# Patient Record
Sex: MALE | NOT HISPANIC OR LATINO | Employment: FULL TIME | URBAN - METROPOLITAN AREA
[De-identification: names, ages, dates, MRNs, and addresses within clinical notes are randomized per-mention and may not be internally consistent; named-entity substitution may affect disease eponyms.]

---

## 2022-04-08 PROCEDURE — 99285 EMERGENCY DEPT VISIT HI MDM: CPT

## 2022-04-09 ENCOUNTER — APPOINTMENT (OUTPATIENT)
Dept: ULTRASOUND IMAGING | Facility: HOSPITAL | Age: 62
End: 2022-04-09

## 2022-04-09 ENCOUNTER — APPOINTMENT (OUTPATIENT)
Dept: CT IMAGING | Facility: HOSPITAL | Age: 62
End: 2022-04-09

## 2022-04-09 ENCOUNTER — INPATIENT HOSPITAL (OUTPATIENT)
Dept: URBAN - METROPOLITAN AREA HOSPITAL 84 | Facility: HOSPITAL | Age: 62
End: 2022-04-09

## 2022-04-09 ENCOUNTER — HOSPITAL ENCOUNTER (INPATIENT)
Facility: HOSPITAL | Age: 62
LOS: 1 days | Discharge: HOME | End: 2022-04-10
Attending: EMERGENCY MEDICINE | Admitting: INTERNAL MEDICINE

## 2022-04-09 DIAGNOSIS — K85.20 ALCOHOL INDUCED ACUTE PANCREATITIS WITHOUT NECROSIS OR INFEC: ICD-10-CM

## 2022-04-09 DIAGNOSIS — R93.3 ABNORMAL FINDINGS ON DIAGNOSTIC IMAGING OF OTHER PARTS OF DI: ICD-10-CM

## 2022-04-09 DIAGNOSIS — R10.13 EPIGASTRIC PAIN: ICD-10-CM

## 2022-04-09 DIAGNOSIS — K85.90 ACUTE PANCREATITIS, UNSPECIFIED COMPLICATION STATUS, UNSPECIFIED PANCREATITIS TYPE: ICD-10-CM

## 2022-04-09 DIAGNOSIS — K57.30 DIVERTICULOSIS OF LARGE INTESTINE WITHOUT PERFORATION OR ABS: ICD-10-CM

## 2022-04-09 DIAGNOSIS — R10.13 EPIGASTRIC PAIN: Primary | ICD-10-CM

## 2022-04-09 DIAGNOSIS — Z80.0 FAMILY HISTORY OF MALIGNANT NEOPLASM OF DIGESTIVE ORGANS: ICD-10-CM

## 2022-04-09 PROBLEM — F19.90 EXCESSIVE USE OF NONSTEROIDAL ANTI-INFLAMMATORY DRUG (NSAID): Status: ACTIVE | Noted: 2022-04-09

## 2022-04-09 PROBLEM — F10.10 ALCOHOL ABUSE: Status: ACTIVE | Noted: 2022-04-09

## 2022-04-09 PROBLEM — I10 ESSENTIAL HYPERTENSION: Status: ACTIVE | Noted: 2022-04-09

## 2022-04-09 PROBLEM — K85.00 IDIOPATHIC ACUTE PANCREATITIS WITHOUT INFECTION OR NECROSIS: Status: ACTIVE | Noted: 2022-04-09

## 2022-04-09 LAB
ALBUMIN SERPL-MCNC: 4.1 G/DL (ref 3.5–5.2)
ALBUMIN/GLOB SERPL: 1.6 G/DL
ALP SERPL-CCNC: 42 U/L (ref 39–117)
ALT SERPL W P-5'-P-CCNC: 16 U/L (ref 1–41)
AMPHET+METHAMPHET UR QL: NEGATIVE
ANION GAP SERPL CALCULATED.3IONS-SCNC: 11 MMOL/L (ref 5–15)
ANION GAP SERPL CALCULATED.3IONS-SCNC: 9 MMOL/L (ref 5–15)
AST SERPL-CCNC: 24 U/L (ref 1–40)
BARBITURATES UR QL SCN: NEGATIVE
BASOPHILS # BLD AUTO: 0.1 10*3/MM3 (ref 0–0.2)
BASOPHILS # BLD AUTO: 0.1 10*3/MM3 (ref 0–0.2)
BASOPHILS NFR BLD AUTO: 0.5 % (ref 0–1.5)
BASOPHILS NFR BLD AUTO: 0.8 % (ref 0–1.5)
BENZODIAZ UR QL SCN: NEGATIVE
BILIRUB SERPL-MCNC: 0.5 MG/DL (ref 0–1.2)
BILIRUB UR QL STRIP: NEGATIVE
BUN SERPL-MCNC: 10 MG/DL (ref 8–23)
BUN SERPL-MCNC: 11 MG/DL (ref 8–23)
BUN/CREAT SERPL: 10.6 (ref 7–25)
BUN/CREAT SERPL: 11.7 (ref 7–25)
CALCIUM SPEC-SCNC: 8.9 MG/DL (ref 8.6–10.5)
CALCIUM SPEC-SCNC: 9.1 MG/DL (ref 8.6–10.5)
CANNABINOIDS SERPL QL: NEGATIVE
CHLORIDE SERPL-SCNC: 106 MMOL/L (ref 98–107)
CHLORIDE SERPL-SCNC: 107 MMOL/L (ref 98–107)
CLARITY UR: CLEAR
CO2 SERPL-SCNC: 24 MMOL/L (ref 22–29)
CO2 SERPL-SCNC: 26 MMOL/L (ref 22–29)
COCAINE UR QL: NEGATIVE
COLOR UR: YELLOW
CREAT SERPL-MCNC: 0.94 MG/DL (ref 0.76–1.27)
CREAT SERPL-MCNC: 0.94 MG/DL (ref 0.76–1.27)
DEPRECATED RDW RBC AUTO: 45.1 FL (ref 37–54)
DEPRECATED RDW RBC AUTO: 45.5 FL (ref 37–54)
EGFRCR SERPLBLD CKD-EPI 2021: 92.2 ML/MIN/1.73
EGFRCR SERPLBLD CKD-EPI 2021: 92.2 ML/MIN/1.73
EOSINOPHIL # BLD AUTO: 0.2 10*3/MM3 (ref 0–0.4)
EOSINOPHIL # BLD AUTO: 0.2 10*3/MM3 (ref 0–0.4)
EOSINOPHIL NFR BLD AUTO: 1.4 % (ref 0.3–6.2)
EOSINOPHIL NFR BLD AUTO: 1.6 % (ref 0.3–6.2)
ERYTHROCYTE [DISTWIDTH] IN BLOOD BY AUTOMATED COUNT: 13.9 % (ref 12.3–15.4)
ERYTHROCYTE [DISTWIDTH] IN BLOOD BY AUTOMATED COUNT: 13.9 % (ref 12.3–15.4)
ETHANOL UR QL: <0.01 %
GLOBULIN UR ELPH-MCNC: 2.5 GM/DL
GLUCOSE SERPL-MCNC: 105 MG/DL (ref 65–99)
GLUCOSE SERPL-MCNC: 113 MG/DL (ref 65–99)
GLUCOSE UR STRIP-MCNC: NEGATIVE MG/DL
HCT VFR BLD AUTO: 37.8 % (ref 37.5–51)
HCT VFR BLD AUTO: 40.1 % (ref 37.5–51)
HGB BLD-MCNC: 12.9 G/DL (ref 13–17.7)
HGB BLD-MCNC: 13.8 G/DL (ref 13–17.7)
HGB UR QL STRIP.AUTO: NEGATIVE
HOLD SPECIMEN: NORMAL
HOLD SPECIMEN: NORMAL
KETONES UR QL STRIP: ABNORMAL
LEUKOCYTE ESTERASE UR QL STRIP.AUTO: NEGATIVE
LIPASE SERPL-CCNC: 1611 U/L (ref 13–60)
LYMPHOCYTES # BLD AUTO: 1.2 10*3/MM3 (ref 0.7–3.1)
LYMPHOCYTES # BLD AUTO: 2 10*3/MM3 (ref 0.7–3.1)
LYMPHOCYTES NFR BLD AUTO: 11 % (ref 19.6–45.3)
LYMPHOCYTES NFR BLD AUTO: 14.6 % (ref 19.6–45.3)
MAGNESIUM SERPL-MCNC: 1.8 MG/DL (ref 1.6–2.4)
MCH RBC QN AUTO: 31.7 PG (ref 26.6–33)
MCH RBC QN AUTO: 31.7 PG (ref 26.6–33)
MCHC RBC AUTO-ENTMCNC: 34.1 G/DL (ref 31.5–35.7)
MCHC RBC AUTO-ENTMCNC: 34.4 G/DL (ref 31.5–35.7)
MCV RBC AUTO: 92.1 FL (ref 79–97)
MCV RBC AUTO: 92.9 FL (ref 79–97)
METHADONE UR QL SCN: NEGATIVE
MONOCYTES # BLD AUTO: 0.3 10*3/MM3 (ref 0.1–0.9)
MONOCYTES # BLD AUTO: 0.8 10*3/MM3 (ref 0.1–0.9)
MONOCYTES NFR BLD AUTO: 2.6 % (ref 5–12)
MONOCYTES NFR BLD AUTO: 6.1 % (ref 5–12)
NEUTROPHILS NFR BLD AUTO: 10.4 10*3/MM3 (ref 1.7–7)
NEUTROPHILS NFR BLD AUTO: 77.1 % (ref 42.7–76)
NEUTROPHILS NFR BLD AUTO: 84.3 % (ref 42.7–76)
NEUTROPHILS NFR BLD AUTO: 9.3 10*3/MM3 (ref 1.7–7)
NITRITE UR QL STRIP: NEGATIVE
NRBC BLD AUTO-RTO: 0 /100 WBC (ref 0–0.2)
NRBC BLD AUTO-RTO: 0 /100 WBC (ref 0–0.2)
OPIATES UR QL: POSITIVE
OXYCODONE UR QL SCN: NEGATIVE
PH UR STRIP.AUTO: 5.5 [PH] (ref 5–8)
PLATELET # BLD AUTO: 230 10*3/MM3 (ref 140–450)
PLATELET # BLD AUTO: 282 10*3/MM3 (ref 140–450)
PMV BLD AUTO: 8.8 FL (ref 6–12)
PMV BLD AUTO: 9.3 FL (ref 6–12)
POTASSIUM SERPL-SCNC: 3.3 MMOL/L (ref 3.5–5.2)
POTASSIUM SERPL-SCNC: 4 MMOL/L (ref 3.5–5.2)
PROT SERPL-MCNC: 6.6 G/DL (ref 6–8.5)
PROT UR QL STRIP: NEGATIVE
RBC # BLD AUTO: 4.07 10*6/MM3 (ref 4.14–5.8)
RBC # BLD AUTO: 4.36 10*6/MM3 (ref 4.14–5.8)
SARS-COV-2 RNA PNL SPEC NAA+PROBE: NOT DETECTED
SODIUM SERPL-SCNC: 141 MMOL/L (ref 136–145)
SODIUM SERPL-SCNC: 142 MMOL/L (ref 136–145)
SP GR UR STRIP: 1.05 (ref 1–1.03)
TROPONIN T SERPL-MCNC: <0.01 NG/ML (ref 0–0.03)
UROBILINOGEN UR QL STRIP: ABNORMAL
WBC NRBC COR # BLD: 11.1 10*3/MM3 (ref 3.4–10.8)
WBC NRBC COR # BLD: 13.5 10*3/MM3 (ref 3.4–10.8)
WHOLE BLOOD HOLD SPECIMEN: NORMAL
WHOLE BLOOD HOLD SPECIMEN: NORMAL

## 2022-04-09 PROCEDURE — 80307 DRUG TEST PRSMV CHEM ANLYZR: CPT | Performed by: PHYSICIAN ASSISTANT

## 2022-04-09 PROCEDURE — 0 IOPAMIDOL PER 1 ML: Performed by: EMERGENCY MEDICINE

## 2022-04-09 PROCEDURE — 83735 ASSAY OF MAGNESIUM: CPT | Performed by: PHYSICIAN ASSISTANT

## 2022-04-09 PROCEDURE — 74177 CT ABD & PELVIS W/CONTRAST: CPT

## 2022-04-09 PROCEDURE — 87635 SARS-COV-2 COVID-19 AMP PRB: CPT | Performed by: EMERGENCY MEDICINE

## 2022-04-09 PROCEDURE — 25010000002 ENOXAPARIN PER 10 MG: Performed by: INTERNAL MEDICINE

## 2022-04-09 PROCEDURE — 83690 ASSAY OF LIPASE: CPT | Performed by: PHYSICIAN ASSISTANT

## 2022-04-09 PROCEDURE — 76705 ECHO EXAM OF ABDOMEN: CPT

## 2022-04-09 PROCEDURE — 85025 COMPLETE CBC W/AUTO DIFF WBC: CPT | Performed by: INTERNAL MEDICINE

## 2022-04-09 PROCEDURE — 81003 URINALYSIS AUTO W/O SCOPE: CPT | Performed by: PHYSICIAN ASSISTANT

## 2022-04-09 PROCEDURE — 99222 1ST HOSP IP/OBS MODERATE 55: CPT | Performed by: INTERNAL MEDICINE

## 2022-04-09 PROCEDURE — 85025 COMPLETE CBC W/AUTO DIFF WBC: CPT | Performed by: PHYSICIAN ASSISTANT

## 2022-04-09 PROCEDURE — 84484 ASSAY OF TROPONIN QUANT: CPT | Performed by: PHYSICIAN ASSISTANT

## 2022-04-09 PROCEDURE — 99223 1ST HOSP IP/OBS HIGH 75: CPT | Performed by: NURSE PRACTITIONER

## 2022-04-09 PROCEDURE — 36415 COLL VENOUS BLD VENIPUNCTURE: CPT | Performed by: INTERNAL MEDICINE

## 2022-04-09 PROCEDURE — 80053 COMPREHEN METABOLIC PANEL: CPT | Performed by: PHYSICIAN ASSISTANT

## 2022-04-09 PROCEDURE — 25010000002 ONDANSETRON PER 1 MG: Performed by: PHYSICIAN ASSISTANT

## 2022-04-09 PROCEDURE — 82077 ASSAY SPEC XCP UR&BREATH IA: CPT | Performed by: PHYSICIAN ASSISTANT

## 2022-04-09 PROCEDURE — 25010000002 MORPHINE PER 10 MG: Performed by: INTERNAL MEDICINE

## 2022-04-09 PROCEDURE — 0 MORPHINE SULFATE 4 MG/ML SOLUTION: Performed by: PHYSICIAN ASSISTANT

## 2022-04-09 RX ORDER — SODIUM CHLORIDE 0.9 % (FLUSH) 0.9 %
10 SYRINGE (ML) INJECTION AS NEEDED
Status: DISCONTINUED | OUTPATIENT
Start: 2022-04-09 | End: 2022-04-10 | Stop reason: HOSPADM

## 2022-04-09 RX ORDER — NITROGLYCERIN 0.4 MG/1
0.4 TABLET SUBLINGUAL
Status: DISCONTINUED | OUTPATIENT
Start: 2022-04-09 | End: 2022-04-10 | Stop reason: HOSPADM

## 2022-04-09 RX ORDER — ONDANSETRON 4 MG/1
4 TABLET, FILM COATED ORAL EVERY 6 HOURS PRN
Status: DISCONTINUED | OUTPATIENT
Start: 2022-04-09 | End: 2022-04-10 | Stop reason: HOSPADM

## 2022-04-09 RX ORDER — MORPHINE SULFATE 4 MG/ML
4 INJECTION, SOLUTION INTRAMUSCULAR; INTRAVENOUS ONCE
Status: COMPLETED | OUTPATIENT
Start: 2022-04-09 | End: 2022-04-09

## 2022-04-09 RX ORDER — ALUMINA, MAGNESIA, AND SIMETHICONE 2400; 2400; 240 MG/30ML; MG/30ML; MG/30ML
15 SUSPENSION ORAL EVERY 6 HOURS PRN
Status: DISCONTINUED | OUTPATIENT
Start: 2022-04-09 | End: 2022-04-10 | Stop reason: HOSPADM

## 2022-04-09 RX ORDER — PANTOPRAZOLE SODIUM 40 MG/10ML
40 INJECTION, POWDER, LYOPHILIZED, FOR SOLUTION INTRAVENOUS EVERY 12 HOURS SCHEDULED
Status: DISCONTINUED | OUTPATIENT
Start: 2022-04-09 | End: 2022-04-10 | Stop reason: HOSPADM

## 2022-04-09 RX ORDER — MORPHINE SULFATE 2 MG/ML
2 INJECTION, SOLUTION INTRAMUSCULAR; INTRAVENOUS
Status: DISCONTINUED | OUTPATIENT
Start: 2022-04-09 | End: 2022-04-10 | Stop reason: HOSPADM

## 2022-04-09 RX ORDER — MELOXICAM 7.5 MG/1
7.5 TABLET ORAL DAILY
Status: ON HOLD | COMMUNITY
End: 2022-04-09

## 2022-04-09 RX ORDER — ACETAMINOPHEN 160 MG/5ML
650 SOLUTION ORAL EVERY 4 HOURS PRN
Status: DISCONTINUED | OUTPATIENT
Start: 2022-04-09 | End: 2022-04-10 | Stop reason: HOSPADM

## 2022-04-09 RX ORDER — ACETAMINOPHEN 325 MG/1
650 TABLET ORAL EVERY 4 HOURS PRN
Status: DISCONTINUED | OUTPATIENT
Start: 2022-04-09 | End: 2022-04-10 | Stop reason: HOSPADM

## 2022-04-09 RX ORDER — SODIUM CHLORIDE 0.9 % (FLUSH) 0.9 %
10 SYRINGE (ML) INJECTION EVERY 12 HOURS SCHEDULED
Status: DISCONTINUED | OUTPATIENT
Start: 2022-04-09 | End: 2022-04-10 | Stop reason: HOSPADM

## 2022-04-09 RX ORDER — ONDANSETRON 2 MG/ML
4 INJECTION INTRAMUSCULAR; INTRAVENOUS EVERY 6 HOURS PRN
Status: DISCONTINUED | OUTPATIENT
Start: 2022-04-09 | End: 2022-04-10 | Stop reason: HOSPADM

## 2022-04-09 RX ORDER — LISINOPRIL AND HYDROCHLOROTHIAZIDE 12.5; 1 MG/1; MG/1
1 TABLET ORAL DAILY
COMMUNITY

## 2022-04-09 RX ORDER — CETIRIZINE HYDROCHLORIDE 10 MG/1
10 TABLET ORAL DAILY
Status: ON HOLD | COMMUNITY
End: 2022-04-09

## 2022-04-09 RX ORDER — ACETAMINOPHEN 650 MG/1
650 SUPPOSITORY RECTAL EVERY 4 HOURS PRN
Status: DISCONTINUED | OUTPATIENT
Start: 2022-04-09 | End: 2022-04-10 | Stop reason: HOSPADM

## 2022-04-09 RX ORDER — MONTELUKAST SODIUM 10 MG/1
10 TABLET ORAL NIGHTLY
Status: DISCONTINUED | OUTPATIENT
Start: 2022-04-09 | End: 2022-04-10 | Stop reason: HOSPADM

## 2022-04-09 RX ORDER — MONTELUKAST SODIUM 10 MG/1
10 TABLET ORAL NIGHTLY
COMMUNITY

## 2022-04-09 RX ORDER — DICLOFENAC SODIUM 75 MG/1
75 TABLET, DELAYED RELEASE ORAL 2 TIMES DAILY
COMMUNITY
End: 2022-04-10 | Stop reason: HOSPADM

## 2022-04-09 RX ORDER — CHOLECALCIFEROL (VITAMIN D3) 125 MCG
5 CAPSULE ORAL NIGHTLY PRN
Status: DISCONTINUED | OUTPATIENT
Start: 2022-04-09 | End: 2022-04-10 | Stop reason: HOSPADM

## 2022-04-09 RX ORDER — SODIUM CHLORIDE, SODIUM LACTATE, POTASSIUM CHLORIDE, CALCIUM CHLORIDE 600; 310; 30; 20 MG/100ML; MG/100ML; MG/100ML; MG/100ML
125 INJECTION, SOLUTION INTRAVENOUS CONTINUOUS
Status: DISCONTINUED | OUTPATIENT
Start: 2022-04-09 | End: 2022-04-10 | Stop reason: HOSPADM

## 2022-04-09 RX ORDER — TADALAFIL 20 MG/1
20 TABLET ORAL WEEKLY
COMMUNITY

## 2022-04-09 RX ORDER — POTASSIUM CHLORIDE 20 MEQ/1
20 TABLET, EXTENDED RELEASE ORAL DAILY
Status: DISCONTINUED | OUTPATIENT
Start: 2022-04-09 | End: 2022-04-10 | Stop reason: HOSPADM

## 2022-04-09 RX ORDER — ONDANSETRON 2 MG/ML
4 INJECTION INTRAMUSCULAR; INTRAVENOUS ONCE
Status: COMPLETED | OUTPATIENT
Start: 2022-04-09 | End: 2022-04-09

## 2022-04-09 RX ORDER — HYDRALAZINE HYDROCHLORIDE 20 MG/ML
10 INJECTION INTRAMUSCULAR; INTRAVENOUS EVERY 6 HOURS PRN
Status: DISCONTINUED | OUTPATIENT
Start: 2022-04-09 | End: 2022-04-10 | Stop reason: HOSPADM

## 2022-04-09 RX ADMIN — IOPAMIDOL 100 ML: 755 INJECTION, SOLUTION INTRAVENOUS at 02:27

## 2022-04-09 RX ADMIN — POTASSIUM CHLORIDE 20 MEQ: 20 TABLET, EXTENDED RELEASE ORAL at 03:39

## 2022-04-09 RX ADMIN — PANTOPRAZOLE SODIUM 40 MG: 40 INJECTION, POWDER, FOR SOLUTION INTRAVENOUS at 20:13

## 2022-04-09 RX ADMIN — PANTOPRAZOLE SODIUM 40 MG: 40 INJECTION, POWDER, FOR SOLUTION INTRAVENOUS at 05:08

## 2022-04-09 RX ADMIN — Medication 10 ML: at 09:05

## 2022-04-09 RX ADMIN — SODIUM CHLORIDE, POTASSIUM CHLORIDE, SODIUM LACTATE AND CALCIUM CHLORIDE 1000 ML: 600; 310; 30; 20 INJECTION, SOLUTION INTRAVENOUS at 03:39

## 2022-04-09 RX ADMIN — Medication 10 ML: at 20:13

## 2022-04-09 RX ADMIN — ENOXAPARIN SODIUM 40 MG: 40 INJECTION SUBCUTANEOUS at 16:25

## 2022-04-09 RX ADMIN — MORPHINE SULFATE 2 MG: 2 INJECTION, SOLUTION INTRAMUSCULAR; INTRAVENOUS at 09:05

## 2022-04-09 RX ADMIN — ONDANSETRON 4 MG: 2 INJECTION INTRAMUSCULAR; INTRAVENOUS at 00:55

## 2022-04-09 RX ADMIN — SODIUM CHLORIDE, POTASSIUM CHLORIDE, SODIUM LACTATE AND CALCIUM CHLORIDE 125 ML/HR: 600; 310; 30; 20 INJECTION, SOLUTION INTRAVENOUS at 20:14

## 2022-04-09 RX ADMIN — MONTELUKAST 10 MG: 10 TABLET, FILM COATED ORAL at 20:13

## 2022-04-09 RX ADMIN — SODIUM CHLORIDE, POTASSIUM CHLORIDE, SODIUM LACTATE AND CALCIUM CHLORIDE 125 ML/HR: 600; 310; 30; 20 INJECTION, SOLUTION INTRAVENOUS at 05:08

## 2022-04-09 RX ADMIN — MORPHINE SULFATE 4 MG: 4 INJECTION INTRAVENOUS at 00:55

## 2022-04-09 RX ADMIN — MORPHINE SULFATE 4 MG: 4 INJECTION INTRAVENOUS at 03:04

## 2022-04-09 RX ADMIN — SODIUM CHLORIDE, POTASSIUM CHLORIDE, SODIUM LACTATE AND CALCIUM CHLORIDE 125 ML/HR: 600; 310; 30; 20 INJECTION, SOLUTION INTRAVENOUS at 13:27

## 2022-04-09 NOTE — ED PROVIDER NOTES
Subjective   Patient is a 61-year-old male who presents to the ED with complaints of epigastric abdominal pain that is been intermittent over the past year or so but became very severe tonight.  Patient states the pain started after eating.  He describes it as a sharp type pain that waxes and wanes in intensity.  He currently rates it as moderate in severity.  Patient states the pain is nonradiating from the area.  Patient reports associated nausea and 2 episodes of emesis earlier this evening without any hematemesis.  Patient reports intermittent diarrhea and constipation which is normal for him.  He denies any black or bloody stools.  No urinary symptoms.  No recent travel or known sick contacts.  No fever body aches or chills.  No significant chest pain or shortness of breath.  He states he tried taking some over-the-counter Tums with minimal relief of his symptoms.  Patient denies any pertinent abdominal surgical history.  States he does take diclofenac and meloxicam for chronic hip pain.  Patient denies any other alleviating or exacerbating factors.      History provided by:  Patient      Review of Systems   Constitutional: Negative.    Eyes: Negative for photophobia and visual disturbance.   Respiratory: Negative.    Cardiovascular: Negative.    Gastrointestinal: Positive for abdominal pain, constipation, diarrhea, nausea and vomiting. Negative for abdominal distention and blood in stool.   Genitourinary: Negative.    Musculoskeletal: Negative.    Skin: Negative.    Neurological: Negative.    Hematological: Negative.        Past Medical History:   Diagnosis Date   • Erectile dysfunction    • Hypertension    • Joint pain        No Known Allergies    Past Surgical History:   Procedure Laterality Date   • CERVICAL FUSION POSTERIOR WITH WIRING     • MEDIAL COLLATERAL LIGAMENT REPAIR, KNEE Bilateral        History reviewed. No pertinent family history.    Social History     Socioeconomic History   • Marital status:     Tobacco Use   • Smoking status: Former Smoker     Packs/day: 1.50     Years: 25.00     Pack years: 37.50     Quit date: 2007     Years since quittin.9   • Smokeless tobacco: Former User     Types: Chew   Substance and Sexual Activity   • Alcohol use: Yes     Alcohol/week: 4.0 standard drinks     Types: 4 Cans of beer per week     Comment: Reports drinking 1 beer every other day more socially   • Drug use: Never           Objective   Physical Exam  Vitals and nursing note reviewed.   Constitutional:       General: He is not in acute distress.     Appearance: Normal appearance. He is well-developed. He is not ill-appearing, toxic-appearing or diaphoretic.   HENT:      Head: Normocephalic and atraumatic.      Mouth/Throat:      Mouth: Mucous membranes are moist.      Pharynx: Oropharynx is clear.   Eyes:      General: No scleral icterus.     Extraocular Movements: Extraocular movements intact.      Pupils: Pupils are equal, round, and reactive to light.   Cardiovascular:      Rate and Rhythm: Normal rate and regular rhythm.      Heart sounds: No murmur heard.    No friction rub. No gallop.   Pulmonary:      Effort: Pulmonary effort is normal. No tachypnea, accessory muscle usage or respiratory distress.      Breath sounds: Normal breath sounds. No stridor. No decreased breath sounds, wheezing, rhonchi or rales.   Chest:      Chest wall: No mass, deformity, tenderness or crepitus.   Abdominal:      General: There is no distension.      Palpations: Abdomen is soft. There is hepatomegaly. There is no splenomegaly or mass.      Tenderness: There is abdominal tenderness in the epigastric area. There is no right CVA tenderness, left CVA tenderness, guarding or rebound. Negative signs include Mercado's sign and McBurney's sign.      Hernia: No hernia is present.   Musculoskeletal:      Cervical back: Normal range of motion and neck supple.   Skin:     General: Skin is warm.      Capillary Refill: Capillary  "refill takes less than 2 seconds.      Findings: No rash.   Neurological:      Mental Status: He is alert and oriented to person, place, and time.   Psychiatric:         Mood and Affect: Mood normal.         Behavior: Behavior normal.         Procedures           ED Course      Blood pressure 116/69, pulse 64, temperature 97.7 °F (36.5 °C), temperature source Oral, resp. rate 15, height 182.9 cm (72\"), weight 81.6 kg (180 lb), SpO2 96 %.    Medications   sodium chloride 0.9 % flush 10 mL (has no administration in time range)   potassium chloride (K-DUR,KLOR-CON) CR tablet 20 mEq (has no administration in time range)   Morphine sulfate (PF) injection 4 mg (4 mg Intravenous Given 4/9/22 0055)   ondansetron (ZOFRAN) injection 4 mg (4 mg Intravenous Given 4/9/22 0055)   iopamidol (ISOVUE-370) 76 % injection 100 mL (100 mL Intravenous Given 4/9/22 0227)   Morphine sulfate (PF) injection 4 mg (4 mg Intravenous Given 4/9/22 0304)     Labs Reviewed   COMPREHENSIVE METABOLIC PANEL - Abnormal; Notable for the following components:       Result Value    Glucose 113 (*)     Potassium 3.3 (*)     All other components within normal limits    Narrative:     GFR Normal >60  Chronic Kidney Disease <60  Kidney Failure <15     LIPASE - Abnormal; Notable for the following components:    Lipase 1,611 (*)     All other components within normal limits   URINALYSIS W/ MICROSCOPIC IF INDICATED (NO CULTURE) - Abnormal; Notable for the following components:    Specific Gravity, UA 1.046 (*)     Ketones, UA Trace (*)     All other components within normal limits    Narrative:     Urine microscopic not indicated.   CBC WITH AUTO DIFFERENTIAL - Abnormal; Notable for the following components:    WBC 11.10 (*)     Neutrophil % 84.3 (*)     Lymphocyte % 11.0 (*)     Monocyte % 2.6 (*)     Neutrophils, Absolute 9.30 (*)     All other components within normal limits   TROPONIN (IN-HOUSE) - Normal    Narrative:     Troponin T Reference Range:  <= 0.03 " ng/mL-   Negative for AMI  >0.03 ng/mL-     Abnormal for myocardial necrosis.  Clinicians would have to utilize clinical acumen, EKG, Troponin and serial changes to determine if it is an Acute Myocardial Infarction or myocardial injury due to an underlying chronic condition.       Results may be falsely decreased if patient taking Biotin.     COVID-19,CEPHEID/VARUN,COR/ANNA/PAD/CHARLES IN-HOUSE,NP SWAB IN TRANSPORT MEDIA 3-4 HR TAT, RT-PCR   RAINBOW DRAW    Narrative:     The following orders were created for panel order [place] Draw.  Procedure                               Abnormality         Status                     ---------                               -----------         ------                     Green Top (Gel)[[id number]]                                  Final result               Lavender Top[[id number]]                                     Final result               Gold Top - SST[[id number]]                                   Final result               Light Blue Top[[id number]]                                   Final result                 Please view results for these tests on the individual orders.   MAGNESIUM   GREEN TOP   LAVENDER TOP   GOLD TOP - SST   LIGHT BLUE TOP   CBC AND DIFFERENTIAL    Narrative:     The following orders were created for panel order CBC & Differential.  Procedure                               Abnormality         Status                     ---------                               -----------         ------                     CBC Auto Differential[[id number]]        Abnormal            Final result                 Please view results for these tests on the individual orders.     CT Abdomen Pelvis With Contrast    Result Date: 4/9/2022  1.  There is very mild peripancreatic edema suggestive of mild acute pancreatitis. 2.  There is a 2.8 cm posterior duodenal diverticulum. Electronically signed by:  Shayan Farmer M.D.  4/9/2022 1:03 AM                                                  MDM  Number of Diagnoses or Management Options  Acute pancreatitis, unspecified complication status, unspecified pancreatitis type  Epigastric pain  Diagnosis management comments: Chart Review:  -No pertinent ED urgent care visits to review at the past 6 months  Comorbidity: As per past medical history  Differentials: DKA, intra-abdominal infection, dissection, peritonitis, peptic ulcer disease, pancreatitis, hepatitis, ischemic bowel, bowel obstruction, appendicitis     ;this list is not all inclusive and does not constitute the entirety of considered causes  ECG: Not warranted  Labs: As above  Imaging: Was interpreted by physician and reviewed by myself:    Disposition/Treatment:  Appropriate PPE was worn during exam and throughout all encounters with the patient.  When the ED IV was placed and labs were obtained patient placed on proper monitors he was afebrile and appeared nontoxic presents to the ED with complaints of epigastric abdominal pain.  Patient is given morphine Zofran and fluids for his pain and nausea.    Lab results showed white blood cell count 11.1 patient is hemodynamically stable.  Lipase elevated at 1611.  Metabolic panel showed glucose 113 but no signs of DKA with an anion gap of 11 bicarb of 24.  Patient's kidney and liver function unremarkable total bilirubin normal.  Potassium 3.3.  Potassium was replaced while in the ED. urinalysis unremarkable for UTI.  Magnesium unremarkable.     CT of abdomen pelvis was significant for acute pancreatitis.  This is new diagnosis for the patient.  Patient states he does drink a beer about every other day could be contributing factor to his acute pancreatitis.  Patient did require additional morphine while in the ED for pain control.  Lab results and findings were discussed with the patient and family at bedside patient will be admitted to hospitalist group for IV hydration and pain control for his pancreatitis.  Patient was in agreement with plan.   Patient will be admitted to hospitalist group.  Spoke to Dr. Torres who agreed for admission.  Urine drug screen and  EtOH added on and pending upon admission. Patient was also given additional LR while in the ED.        Amount and/or Complexity of Data Reviewed  Clinical lab tests: reviewed        Final diagnoses:   Epigastric pain   Acute pancreatitis, unspecified complication status, unspecified pancreatitis type       ED Disposition  ED Disposition     ED Disposition   Decision to Admit    Condition   --    Comment   Level of Care: Telemetry [5]   Admitting Physician: RADHA TORRES [[id number]]   Attending Physician: RADHA TORRES [[id number]]               No follow-up provider specified.       Medication List      No changes were made to your prescriptions during this visit.          Katherine Jalloh PA  04/09/22 0316       Katherine Jalloh PA  04/09/22 0321       Katherine Jalloh PA  04/09/22 0326

## 2022-04-09 NOTE — PLAN OF CARE
Goal Outcome Evaluation:  Plan of Care Reviewed With: patient        Progress: no change  Outcome Evaluation: Patient shows no s/s of distress and vitals are stable. Pt voiced concerns of pain, see MAR. Call light within reach. Will continue to observe.

## 2022-04-09 NOTE — ED NOTES
Reports intermittent epigastric pain x1 year, tonight had just eaten dinner and was watching TV when sharp pain started in epigastrium and was increasing in severity. Pain did not radiate, when he called EMS he states that he was able to relieve the pain by applying pressure to area, reports some nausea, no vomiting.

## 2022-04-09 NOTE — PROGRESS NOTES
[facility] Medicine Services        Patient Name: Jerson Cox  : 1960  MRN: [id number]  Primary Care Physician:  Lin Ching APRN  Date of admission: 2022        Subjective       Chief Complaint: Abdominal pain     History of Present Illness: Jerson Cox is a 61 y.o. male who presented to [facility] on 2022 with a past medical history of arthritis possible rheumatoid arthritis LUPIS is negative but rheumatoid factor was mildly elevated, hypertension and NSAID abuse.  The patient has been having abdominal pain for about the last year.  But it comes and goes but tonight after he ate the pain became so severe.  It was piercing and upper epigastric and was not getting better so he came to the emergency room.  In the emergency room his lipase was elevated to 1600.  CT scan was performed and indicated acute pancreatitis.  So the patient will be admitted for pancreatitis.  The cause could be from alcohol he drinks most days of the week and there is some question about how much.  CT did not reveal any gallstones but will get a right upper quadrant ultrasound to determine if gallstones are the cause.  The patient also takes multiple NSAIDs.  He has a prescription for Mobic 7.5 and diclofenac 75 mg and then he takes ibuprofen periodically these can be associated with pancreatitis as well.  And for that matter lisinopril and hydrochlorothiazide can also cause pancreatitis.        ROS acute piercing abdominal pain, was worse after eating, only resolved after coming to the hospital receiving narcotics, patient denies fever, chills, chest pain, shortness of breath, rash, patient has chronic joint pains but no different than normal, cough, congestion, leg swelling changes in bowel or bladder nausea or vomiting headache and the rest of 15 essential review of systems have been reviewed and are negative   Hospital course  2022; patient is still endorsing  some epigastric pain and tenderness continue to monitor continue n.p.o. consult gastroenterology likely alcoholic pancreatitis  Personal History      Medical History        Past Medical History:   Diagnosis Date   • Erectile dysfunction     • Hypertension     • Joint pain              Surgical History         Past Surgical History:   Procedure Laterality Date   • CERVICAL FUSION POSTERIOR WITH WIRING       • MEDIAL COLLATERAL LIGAMENT REPAIR, KNEE Bilateral              Family History: family history is not on file. Otherwise pertinent FHx was reviewed and not pertinent to current issue.     Social History:  reports that he quit smoking about 14 years ago. He has a 37.50 pack-year smoking history. He has quit using smokeless tobacco.  His smokeless tobacco use included chew. He reports current alcohol use of about 4.0 standard drinks of alcohol per week. He reports that he does not use drugs.     Home Medications:           Prior to Admission Medications      Prescriptions Last Dose Informant Patient Reported? Taking?     diclofenac (VOLTAREN) 75 MG EC tablet 4/8/2022   Yes Yes     Take 75 mg by mouth 2 (Two) Times a Day.     lisinopril-hydrochlorothiazide (PRINZIDE,ZESTORETIC) 10-12.5 MG per tablet 4/8/2022 Medication Bottle Yes Yes     Take 1 tablet by mouth Daily.     meloxicam (MOBIC) 7.5 MG tablet Past Month Medication Bottle Yes Yes     Take 7.5 mg by mouth Daily.     tadalafil (ADCIRCA) 20 MG tablet tablet Past Month   Yes Yes     Take 40 mg by mouth Daily.                Allergies:  No Known Allergies     Objective       Vitals:   Temp:  [97.5 °F (36.4 °C)-98.5 °F (36.9 °C)] 98.5 °F (36.9 °C)  Heart Rate:  [55-70] 65  Resp:  [15-20] 16  BP: (102-146)/(60-90) 114/73     Physical Exam   General no apparent distress well-developed well-groomed lying in the bed comfortable  Head atraumatic  Oropharynx membranes moist no erythema  Nares no nasal discharge  Neck no thyromegaly or lymphadenopathy  Pulmonary lungs  clear to auscultation bilaterally no accessory muscle use  Cardiac regular rate and rhythm cannot appreciate any murmurs no peripheral edema  Abdomen mild upper epigastric tenderness no Mercado sign no guarding no rebound no distention bowel sounds present.  Extremities well-developed no cyanosis no edema  Neuro cranial nerves II through XII intact, no tremor  Psych patient is alert and oriented x4 answers all questions appropriately appropriate mood and effect     Result Review    Result Review:  I have personally reviewed the results from the time of this admission to 4/9/2022 04:46 EDT and agree with these findings:  [x]?  Laboratory  []?  Microbiology  [x]?  Radiology  []?  EKG/Telemetry   []?  Cardiology/Vascular   []?  Pathology  []?  Old records  []?  Other:  Most notable findings include:   Sodium 142, potassium 3.3, bicarb 24, creatinine 0.9, BUN 11, glucose 113, CMP was fairly unremarkable  Lipase was 1600  Hemoglobin 14, WBCs 11,000, platelets 282  Liver ultrasound pending        IMPRESSION: CTA of abdomen  1.  There is very mild peripancreatic edema suggestive of mild acute pancreatitis.  2.  There is a 2.8 cm posterior duodenal diverticulum.   CT Abdomen Pelvis With Contrast    Result Date: 4/9/2022  1.  There is very mild peripancreatic edema suggestive of mild acute pancreatitis. 2.  There is a 2.8 cm posterior duodenal diverticulum. Electronically signed by:  Shayan Farmer M.D.  4/9/2022 1:03 AM    US Liver    Result Date: 4/9/2022  There is some gallbladder wall thickening up to 5 mm. This is likely reflective of the patient's overall fluid status. The pancreas is not seen well enough by ultrasound to evaluate for the presence of pancreatitis. Electronically signed by:  Marcel Morton M.D.  4/9/2022 4:54 AM       Assessment/Plan          Active Hospital Problems:       Active Hospital Problems     Diagnosis     • Epigastric pain        Plan:   1.  Acute pancreatitis likely alcohol induced  -ultrasound gallbladder wall thickening 5 mm but likely from fluid status not inflammation .CT scan did not show any stones or actually dilated gallbladder.   consult GI for possible ERCP.  If this work-up proves to be negative other likely causes are alcohol he does drink most days of the week according to him.  2.  Alcohol abuse -counseled on cessation  3.  Essential hypertension have him on hydralazine now as needed hold lisinopril hydrochlorothiazide  4.  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  The patient does have a appointment with a rheumatologist and I think that will be an excellent idea.  LUPIS is negative.  Regardless needs to probably have physical therapy on an outpatient basis to help him with his pain and avoid excess NSAIDs may be try topical NSAIDs  DVT prophylaxis:  Medical DVT prophylaxis orders are present.     CODE STATUS:    Level Of Support Discussed With: Patient  Code Status (Patient has no pulse and is not breathing): CPR (Attempt to Resuscitate)  Medical Interventions (Patient has pulse or is breathing): Full Support     Admission Status:  I believe this patient meets inpatient status.     I discussed the patient's findings and my recommendations with patient and family.     This patient has been examined wearing appropriate Personal Protective Equipment and discussed with . 04/09/22    Electronically signed by Ariel Link MD, 04/09/22, 2:36 PM EDT.

## 2022-04-09 NOTE — H&P
[facility] Medicine Services      Patient Name: Jerson Cox  : 1960  MRN: [id number]  Primary Care Physician:  Lin Ching APRN  Date of admission: 2022      Subjective      Chief Complaint: Abdominal pain    History of Present Illness: Jerson Cox is a 61 y.o. male who presented to [facility] on 2022 with a past medical history of arthritis possible rheumatoid arthritis LUPIS is negative but rheumatoid factor was mildly elevated, hypertension and NSAID abuse.  The patient has been having abdominal pain for about the last year.  But it comes and goes but tonight after he ate the pain became so severe.  It was piercing and upper epigastric and was not getting better so he came to the emergency room.  In the emergency room his lipase was elevated to 1600.  CT scan was performed and indicated acute pancreatitis.  So the patient will be admitted for pancreatitis.  The cause could be from alcohol he drinks most days of the week and there is some question about how much.  CT did not reveal any gallstones but will get a right upper quadrant ultrasound to determine if gallstones are the cause.  The patient also takes multiple NSAIDs.  He has a prescription for Mobic 7.5 and diclofenac 75 mg and then he takes ibuprofen periodically these can be associated with pancreatitis as well.  And for that matter lisinopril and hydrochlorothiazide can also cause pancreatitis.      ROS acute piercing abdominal pain, was worse after eating, only resolved after coming to the hospital receiving narcotics, patient denies fever, chills, chest pain, shortness of breath, rash, patient has chronic joint pains but no different than normal, cough, congestion, leg swelling changes in bowel or bladder nausea or vomiting headache and the rest of 15 essential review of systems have been reviewed and are negative    Personal History     Past Medical History:   Diagnosis Date   •  Erectile dysfunction    • Hypertension    • Joint pain        Past Surgical History:   Procedure Laterality Date   • CERVICAL FUSION POSTERIOR WITH WIRING     • MEDIAL COLLATERAL LIGAMENT REPAIR, KNEE Bilateral        Family History: family history is not on file. Otherwise pertinent FHx was reviewed and not pertinent to current issue.    Social History:  reports that he quit smoking about 14 years ago. He has a 37.50 pack-year smoking history. He has quit using smokeless tobacco.  His smokeless tobacco use included chew. He reports current alcohol use of about 4.0 standard drinks of alcohol per week. He reports that he does not use drugs.    Home Medications:  Prior to Admission Medications     Prescriptions Last Dose Informant Patient Reported? Taking?    diclofenac (VOLTAREN) 75 MG EC tablet 4/8/2022  Yes Yes    Take 75 mg by mouth 2 (Two) Times a Day.    lisinopril-hydrochlorothiazide (PRINZIDE,ZESTORETIC) 10-12.5 MG per tablet 4/8/2022 Medication Bottle Yes Yes    Take 1 tablet by mouth Daily.    meloxicam (MOBIC) 7.5 MG tablet Past Month Medication Bottle Yes Yes    Take 7.5 mg by mouth Daily.    tadalafil (ADCIRCA) 20 MG tablet tablet Past Month  Yes Yes    Take 40 mg by mouth Daily.            Allergies:  No Known Allergies    Objective      Vitals:   Temp:  [97.7 °F (36.5 °C)] 97.7 °F (36.5 °C)  Heart Rate:  [55-70] 58  Resp:  [15-20] 15  BP: (102-146)/(60-90) 138/83    Physical Exam   General no apparent distress well-developed well-groomed  Head atraumatic  Oropharynx membranes moist no erythema  Nares no nasal discharge  Neck no thyromegaly or lymphadenopathy  Pulmonary lungs clear to auscultation bilaterally no accessory muscle use  Cardiac regular rate and rhythm cannot appreciate any murmurs no peripheral edema  Abdomen mild upper epigastric tenderness no Mercado sign no guarding no rebound no distention bowel sounds present  Extremities well-developed no cyanosis no edema  Neuro cranial nerves II  through XII intact, no tremor  Psych patient is alert and oriented x4 answers all questions appropriately appropriate mood and effect    Result Review    Result Review:  I have personally reviewed the results from the time of this admission to 4/9/2022 04:46 EDT and agree with these findings:  [x]  Laboratory  []  Microbiology  [x]  Radiology  []  EKG/Telemetry   []  Cardiology/Vascular   []  Pathology  []  Old records  []  Other:  Most notable findings include:   Sodium 142, potassium 3.3, bicarb 24, creatinine 0.9, BUN 11, glucose 113, CMP was fairly unremarkable  Lipase was 1600  Hemoglobin 14, WBCs 11,000, platelets 282  Liver ultrasound pending      IMPRESSION: CTA of abdomen  1.  There is very mild peripancreatic edema suggestive of mild acute pancreatitis.  2.  There is a 2.8 cm posterior duodenal diverticulum.      Assessment/Plan        Active Hospital Problems:  Active Hospital Problems    Diagnosis    • Epigastric pain      Plan:   1.  Acute pancreatitis -ultrasound has been ordered to check for gallstones .CT scan did not show any stones or actually dilated gallbladder.  If positive will contact general surgery for possible cholecystectomy.  If there is any indication for possible bile duct stone will consult GI for possible ERCP.  If this work-up proves to be negative other likely causes are alcohol he does drink most days of the week according to him but not excessive but is a distinct possibility.  Another distinct possibility is NSAIDs induced pancreatitis he is taking prescribed Mobic and diclofenac daily.  He is also taking ibuprofen may be 3 sometimes 4 times a week on top of these.  This could be another potential cause.  Course almost all drugs are associated with the possibility of pancreatitis and that included his blood pressure medications HCTZ and lisinopril.  The highest probability if is not gallstones most likely is the alcohol and NSAIDs.  Had long discussion about both of these tonight  and how if he is taking one of the NSAIDs he can take the others and the importance of cessation from alcohol.  Regardless when patient's pain is under control and able to take sufficient input by mouth he can be discharged  2.  Alcohol abuse -counseled on cessation  3.  Essential hypertension have him on hydralazine now as needed once he stabilizes can reinitiate his home blood pressure medication.  If however after stopping drinking NSAIDs and no gallstones may have to change his blood pressure medications  4.  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  The patient does have a appointment with a rheumatologist and I think that will be an excellent idea.  LUPIS is negative.  Regardless needs to probably have physical therapy on an outpatient basis to help him with his pain and avoid excess NSAIDs may be try topical NSAIDs  5.  Abdominal pain although most likely it is pancreatitis he may have some gastritis from all of the NSAIDs.  Recommend setting the patient up with GI on an outpatient basis for an EGD and if he has not had his colonoscopy get that as well.  Unless patient condition worsens this can be an outpatient work-up  DVT prophylaxis:  Medical DVT prophylaxis orders are present.    CODE STATUS:    Level Of Support Discussed With: Patient  Code Status (Patient has no pulse and is not breathing): CPR (Attempt to Resuscitate)  Medical Interventions (Patient has pulse or is breathing): Full Support    Admission Status:  I believe this patient meets inpatient status.    I discussed the patient's findings and my recommendations with patient and family.    This patient has been examined wearing appropriate Personal Protective Equipment and discussed with . 04/09/22      Signature:

## 2022-04-10 VITALS
OXYGEN SATURATION: 93 % | HEIGHT: 70 IN | RESPIRATION RATE: 17 BRPM | SYSTOLIC BLOOD PRESSURE: 122 MMHG | BODY MASS INDEX: 28.97 KG/M2 | TEMPERATURE: 97.7 F | WEIGHT: 202.38 LBS | DIASTOLIC BLOOD PRESSURE: 78 MMHG | HEART RATE: 59 BPM

## 2022-04-10 PROBLEM — F10.20 ALCOHOL USE DISORDER, MODERATE, DEPENDENCE: Status: ACTIVE | Noted: 2022-04-10

## 2022-04-10 LAB
ALBUMIN SERPL-MCNC: 3.7 G/DL (ref 3.5–5.2)
ALBUMIN/GLOB SERPL: 1.5 G/DL
ALP SERPL-CCNC: 49 U/L (ref 39–117)
ALT SERPL W P-5'-P-CCNC: 12 U/L (ref 1–41)
ANION GAP SERPL CALCULATED.3IONS-SCNC: 7 MMOL/L (ref 5–15)
AST SERPL-CCNC: 15 U/L (ref 1–40)
BASOPHILS # BLD AUTO: 0 10*3/MM3 (ref 0–0.2)
BASOPHILS NFR BLD AUTO: 0.4 % (ref 0–1.5)
BILIRUB SERPL-MCNC: 0.7 MG/DL (ref 0–1.2)
BUN SERPL-MCNC: 8 MG/DL (ref 8–23)
BUN/CREAT SERPL: 9.2 (ref 7–25)
CALCIUM SPEC-SCNC: 8.5 MG/DL (ref 8.6–10.5)
CHLORIDE SERPL-SCNC: 104 MMOL/L (ref 98–107)
CO2 SERPL-SCNC: 29 MMOL/L (ref 22–29)
CREAT SERPL-MCNC: 0.87 MG/DL (ref 0.76–1.27)
DEPRECATED RDW RBC AUTO: 45.9 FL (ref 37–54)
EGFRCR SERPLBLD CKD-EPI 2021: 98.2 ML/MIN/1.73
EOSINOPHIL # BLD AUTO: 0.2 10*3/MM3 (ref 0–0.4)
EOSINOPHIL NFR BLD AUTO: 2.4 % (ref 0.3–6.2)
ERYTHROCYTE [DISTWIDTH] IN BLOOD BY AUTOMATED COUNT: 14 % (ref 12.3–15.4)
GLOBULIN UR ELPH-MCNC: 2.5 GM/DL
GLUCOSE SERPL-MCNC: 108 MG/DL (ref 65–99)
HCT VFR BLD AUTO: 38.3 % (ref 37.5–51)
HGB BLD-MCNC: 13.3 G/DL (ref 13–17.7)
LIPASE SERPL-CCNC: 1752 U/L (ref 13–60)
LYMPHOCYTES # BLD AUTO: 2.3 10*3/MM3 (ref 0.7–3.1)
LYMPHOCYTES NFR BLD AUTO: 22.3 % (ref 19.6–45.3)
MCH RBC QN AUTO: 32.6 PG (ref 26.6–33)
MCHC RBC AUTO-ENTMCNC: 34.6 G/DL (ref 31.5–35.7)
MCV RBC AUTO: 94.2 FL (ref 79–97)
MONOCYTES # BLD AUTO: 0.7 10*3/MM3 (ref 0.1–0.9)
MONOCYTES NFR BLD AUTO: 6.9 % (ref 5–12)
NEUTROPHILS NFR BLD AUTO: 6.9 10*3/MM3 (ref 1.7–7)
NEUTROPHILS NFR BLD AUTO: 68 % (ref 42.7–76)
NRBC BLD AUTO-RTO: 0 /100 WBC (ref 0–0.2)
PLATELET # BLD AUTO: 249 10*3/MM3 (ref 140–450)
PMV BLD AUTO: 9.4 FL (ref 6–12)
POTASSIUM SERPL-SCNC: 4.1 MMOL/L (ref 3.5–5.2)
PROT SERPL-MCNC: 6.2 G/DL (ref 6–8.5)
RBC # BLD AUTO: 4.07 10*6/MM3 (ref 4.14–5.8)
SODIUM SERPL-SCNC: 140 MMOL/L (ref 136–145)
WBC NRBC COR # BLD: 10.1 10*3/MM3 (ref 3.4–10.8)

## 2022-04-10 PROCEDURE — 83690 ASSAY OF LIPASE: CPT | Performed by: HOSPITALIST

## 2022-04-10 PROCEDURE — 99239 HOSP IP/OBS DSCHRG MGMT >30: CPT | Performed by: INTERNAL MEDICINE

## 2022-04-10 PROCEDURE — 85025 COMPLETE CBC W/AUTO DIFF WBC: CPT | Performed by: HOSPITALIST

## 2022-04-10 PROCEDURE — 80053 COMPREHEN METABOLIC PANEL: CPT | Performed by: HOSPITALIST

## 2022-04-10 RX ORDER — OXYCODONE HYDROCHLORIDE 5 MG/1
5 TABLET ORAL EVERY 8 HOURS PRN
Qty: 12 TABLET | Refills: 0 | Status: SHIPPED | OUTPATIENT
Start: 2022-04-10 | End: 2022-04-14

## 2022-04-10 RX ADMIN — PANTOPRAZOLE SODIUM 40 MG: 40 INJECTION, POWDER, FOR SOLUTION INTRAVENOUS at 08:14

## 2022-04-10 RX ADMIN — POTASSIUM CHLORIDE 20 MEQ: 20 TABLET, EXTENDED RELEASE ORAL at 08:14

## 2022-04-10 RX ADMIN — SODIUM CHLORIDE, POTASSIUM CHLORIDE, SODIUM LACTATE AND CALCIUM CHLORIDE 125 ML/HR: 600; 310; 30; 20 INJECTION, SOLUTION INTRAVENOUS at 06:44

## 2022-04-10 RX ADMIN — Medication 10 ML: at 08:14

## 2022-04-10 NOTE — DISCHARGE INSTRUCTIONS
Patient was advised to follow-up with his primary care physician who will review his current medications.

## 2022-04-10 NOTE — DISCHARGE SUMMARY
[facility] Medicine Services  DISCHARGE SUMMARY    Patient Name: Jerson Cox  : 1960  MRN: [id number]    Date of Admission: 2022  Discharge Diagnosis: Acute pancreatitis.  Date of Discharge: 4/10/2022.  Primary Care Physician: Lin Ching APRN      Presenting Problem:   Epigastric pain [R10.13]  Acute pancreatitis, unspecified complication status, unspecified pancreatitis type [K85.90]    Active and Resolved Hospital Problems:  Active Hospital Problems    Diagnosis POA   • **Idiopathic acute pancreatitis without infection or necrosis [K85.00] Yes     Priority: High   • Epigastric pain [R10.13] Yes     Priority: Medium   • Excessive use of nonsteroidal anti-inflammatory drug (NSAID) [F19.90] Yes     Priority: Medium   • Alcohol use disorder, moderate, dependence (HCC) [F10.20] Yes     Priority: Low   • Alcohol abuse [F10.10] Yes   • Essential hypertension [I10] Yes      Resolved Hospital Problems   No resolved problems to display.         Hospital Course   From previous note and with minor updates.  Hospital Course:  Patient is a 61 y.o. male who presented to [facility] on 2022 with a past medical history of arthritis possible rheumatoid arthritis LUPIS is negative but rheumatoid factor was mildly elevated, hypertension and NSAID abuse.  The patient has been having abdominal pain for about the last year.  But it comes and goes but tonight after he ate the pain became so severe.  It was piercing and upper epigastric and was not getting better so he came to the emergency room.  In the emergency room his lipase was elevated to 1600.  CT scan was performed and indicated acute pancreatitis.  So the patient will be admitted for pancreatitis.  The cause could be from alcohol he drinks most days of the week and there is some question about how much.  CT did not reveal any gallstones but will get a right upper quadrant ultrasound to determine if gallstones  are the cause.  The patient also takes multiple NSAIDs.  He has a prescription for Mobic 7.5 and diclofenac 75 mg and then he takes ibuprofen periodically these can be associated with pancreatitis as well.  And for that matter lisinopril and hydrochlorothiazide can also cause pancreatitis.  Acute pancreatitis was treated with IV fluids, pain control and nothing by mouth.  Alcohol use disorder was treated with supportive care.  Alcohol cessation counseling was completed.  Hypertension was stable.  Appropriate patient's home medications were resumed in the hospital for other chronic medical conditions.  Patient reported complete resolution of his symptoms after over 24 hours in the hospital and requested to be discharged home.  Patient was advised to take his medications as prescribed.  Discharge medications are as per medication reconciliation list.  Patient was advised to follow-up with his primary care physician within 3 to 5 days of discharge.  Patient was advised to return to the emergency department if he experiences any recurrence of his symptoms.  Patient and family agreed with the plan and patient was discharged in a stable condition.      DISCHARGE Follow Up Recommendations for labs and diagnostics:     Patient was advised to follow-up with his primary care physician who will review his current medications.    Reasons For Change In Medications and Indications for New Medications:      Day of Discharge     Vital Signs:  Temp:  [97.7 °F (36.5 °C)-98.5 °F (36.9 °C)] 97.7 °F (36.5 °C)  Heart Rate:  [59-69] 59  Resp:  [16-17] 17  BP: (107-122)/(67-78) 122/78    Physical Exam:  Physical Exam  Vitals and nursing note reviewed.   Constitutional:       General: He is not in acute distress.  HENT:      Head: Normocephalic.      Nose: Nose normal.      Mouth/Throat:      Mouth: Mucous membranes are dry.      Pharynx: Oropharynx is clear.   Eyes:      Extraocular Movements: Extraocular movements intact.       Conjunctiva/sclera: Conjunctivae normal.      Pupils: Pupils are equal, round, and reactive to light.   Cardiovascular:      Pulses: Normal pulses.      Heart sounds: No murmur heard.    No friction rub. No gallop.      Comments: S1 and S2 present.  No tachycardia.  Pulmonary:      Effort: Pulmonary effort is normal.      Breath sounds: No stridor. No wheezing or rales.   Chest:      Chest wall: No tenderness.   Abdominal:      General: Bowel sounds are normal. There is no distension.      Palpations: Abdomen is soft.      Tenderness: There is no abdominal tenderness. There is no right CVA tenderness or guarding.   Musculoskeletal:         General: No swelling, tenderness, deformity or signs of injury.      Cervical back: Normal range of motion. No rigidity.      Right lower leg: No edema.      Left lower leg: No edema.   Skin:     General: Skin is warm and dry.      Capillary Refill: Capillary refill takes less than 2 seconds.      Coloration: Skin is not jaundiced.      Findings: No bruising, erythema, lesion or rash.   Neurological:      Comments: No facial asymmetry noted.  Gait and station not tested.   Psychiatric:      Comments: No agitation.              Pertinent  and/or Most Recent Results     LAB RESULTS:      Lab 04/10/22  0525 04/09/22  0637 04/09/22  0042   WBC 10.10 13.50* 11.10*   HEMOGLOBIN 13.3 12.9* 13.8   HEMATOCRIT 38.3 37.8 40.1   PLATELETS 249 230 282   NEUTROS ABS 6.90 10.40* 9.30*   LYMPHS ABS 2.30 2.00 1.20   MONOS ABS 0.70 0.80 0.30   EOS ABS 0.20 0.20 0.20   MCV 94.2 92.9 92.1         Lab 04/10/22  0525 04/09/22  0637 04/09/22  0042   SODIUM 140 141 142   POTASSIUM 4.1 4.0 3.3*   CHLORIDE 104 106 107   CO2 29.0 26.0 24.0   ANION GAP 7.0 9.0 11.0   BUN 8 10 11   CREATININE 0.87 0.94 0.94   EGFR 98.2 92.2 92.2   GLUCOSE 108* 105* 113*   CALCIUM 8.5* 9.1 8.9   MAGNESIUM  --   --  1.8         Lab 04/10/22  0525 04/09/22  0042   TOTAL PROTEIN 6.2 6.6   ALBUMIN 3.70 4.10   GLOBULIN 2.5 2.5    ALT (SGPT) 12 16   AST (SGOT) 15 24   BILIRUBIN 0.7 0.5   ALK PHOS 49 42   LIPASE 1,752* 1,611*         Lab 04/09/22  0042   TROPONIN T <0.010                 Brief Urine Lab Results  (Last result in the past 365 days)      Color   Clarity   Blood   Leuk Est   Nitrite   Protein   CREAT   Urine HCG        04/09/22 0256 Yellow   Clear   Negative   Negative   Negative   Negative               Microbiology Results (last 10 days)     Procedure Component Value - Date/Time    COVID-19,CEPHEID/VARUN,COR/ANNA/PAD/CHARLES IN-HOUSE(OR EMERGENT/ADD-ON),NP SWAB IN TRANSPORT MEDIA 3-4 HR TAT, RT-PCR - Swab, Nasopharynx [[id number]]  (Normal) Collected: 04/09/22 0431    Lab Status: Final result Specimen: Swab from Nasopharynx Updated: 04/09/22 0458     COVID19 Not Detected    Narrative:      Fact sheet for providers: https://www.fda.gov/media/505210/download     Fact sheet for patients: https://www.fda.gov/media/292864/download  Fact sheet for providers: https://www.fda.gov/media/654731/download    Fact sheet for patients: https://www.fda.gov/media/656224/download    Test performed by PCR.          CT Abdomen Pelvis With Contrast    Result Date: 4/9/2022  Impression: 1.  There is very mild peripancreatic edema suggestive of mild acute pancreatitis. 2.  There is a 2.8 cm posterior duodenal diverticulum. Electronically signed by:  Shayan Farmer M.D.  4/9/2022 1:03 AM    US Liver    Result Date: 4/9/2022  Impression: There is some gallbladder wall thickening up to 5 mm. This is likely reflective of the patient's overall fluid status. The pancreas is not seen well enough by ultrasound to evaluate for the presence of pancreatitis. Electronically signed by:  Marcel Morton M.D.  4/9/2022 4:54 AM                  Labs Pending at Discharge:      Procedures Performed           Consults:   Consults     Date and Time Order Name Status Description    4/9/2022  9:22 AM Inpatient Gastroenterology Consult      4/9/2022  3:12 AM Hospitalist  (on-call MD unless specified)              Discharge Details        Discharge Medications      New Medications      Instructions Start Date   oxyCODONE 5 MG immediate release tablet  Commonly known as: Roxicodone   5 mg, Oral, Every 8 Hours PRN         Continue These Medications      Instructions Start Date   lisinopril-hydrochlorothiazide 10-12.5 MG per tablet  Commonly known as: PRINZIDE,ZESTORETIC   1 tablet, Oral, Daily      montelukast 10 MG tablet  Commonly known as: SINGULAIR   10 mg, Oral, Nightly      tadalafil 20 MG tablet tablet  Commonly known as: ADCIRCA   20 mg, Oral, Weekly         Stop These Medications    diclofenac 75 MG EC tablet  Commonly known as: VOLTAREN            No Known Allergies      Discharge Disposition: Stable.  Home or Self Care    Diet:  Hospital:  Diet Order   Procedures   • Diet Gastrointestinal; Low Residue         Discharge Activity: As tolerated.        CODE STATUS:  Code Status and Medical Interventions:   Ordered at: 04/09/22 0409     Level Of Support Discussed With:    Patient     Code Status (Patient has no pulse and is not breathing):    CPR (Attempt to Resuscitate)     Medical Interventions (Patient has pulse or is breathing):    Full Support         No future appointments.        Time spent on Discharge including face to face service: 40  minutes    This patient has been examined wearing appropriate Personal Protective Equipment and discussed with hospital infection control department, [facility] department, infectious disease specialist and pulmonologist. 04/10/22      Signature: Electronically signed by Timmy Driscoll MD, FACP, 04/10/22, 9:03 AM EDT.

## 2022-04-10 NOTE — PLAN OF CARE
Goal Outcome Evaluation:              Outcome Evaluation: Pt states he has been pain free all night and is ready to try real food. No nausea/vomiting or abdominal assosiated with clear liquid diet. OK per GI note to advance diet as tolerated so a diet order has been increased. Continue to monitor.

## 2022-04-10 NOTE — CONSULTS
GI CONSULT  NOTE:    Referring Provider:    Dr Link    Chief complaint:   Acute pancreatitis    Subjective    Epigastric pain     History of present illness:    Patient is a 61-year-old male with a history of hypertension and daily alcohol use presented to the ER on 4/9/2021 with complaint of epigastric abdominal pain.  Patient states his abdominal pain has been intermittent over the past year but became at its severest the night he came to the ER.  He described as a sharp abdominal pain with crescendo and decrescendo type pain.  Patient states the only time he vomited was during the ambulance ride..  Patient states his pain started after drinking a beer and eating grilled cheese with jalapenos.  Patient has been taking diclofenac twice a day, meloxicam as needed and ibuprofen about 3 times a week.  Patient states he has diarrhea about twice a day.  Denies any constipation, abdominal distention.  No melena hematochezia or bright red blood per rectum.  Patient denies any weight loss.  Brother has a history of colon cancer but he has never had a colonoscopy.  States he did Cologuard previously.  Labs: Potassium was 3.3 upon admission and is now 4.  CMP is otherwise normal.  Lipase was 1600 upon admission.  White blood cell counts is 13.5, hemoglobin 12.9 with an MCV of 92.9, platelets are 230.  CT of the abdomen and pelvis showed very mild peripancreatic edema suggestive of mild acute pancreatitis.  2.8 cm posterior duodenal diverticulum.  Right upper quadrant ultrasound shows some gallbladder wall thickening up to 5 mm.  Likely related to patient's overall fluid status.  Pancreas is not well seen.  Common bile duct 6 mm.  No stones identified.  Patient states he drinks 5 days out of 7 but wife states he drinks every day a mixture of mixed drinks as well as beer.    Endo History:  None    Past Medical History:  Past Medical History:   Diagnosis Date   • Erectile dysfunction    • Hypertension    • Joint pain         Past Surgical History:  Past Surgical History:   Procedure Laterality Date   • CERVICAL FUSION POSTERIOR WITH WIRING     • MEDIAL COLLATERAL LIGAMENT REPAIR, KNEE Bilateral        Social History:  Social History     Tobacco Use   • Smoking status: Former Smoker     Packs/day: 1.50     Years: 25.00     Pack years: 37.50     Quit date: 2007     Years since quittin.9   • Smokeless tobacco: Former User     Types: Chew   Substance Use Topics   • Alcohol use: Yes     Alcohol/week: 4.0 standard drinks     Types: 4 Cans of beer per week     Comment: Reports drinking 1 beer every other day more socially   • Drug use: Never       Family History:  History reviewed. No pertinent family history.    Medications:  Medications Prior to Admission   Medication Sig Dispense Refill Last Dose   • diclofenac (VOLTAREN) 75 MG EC tablet Take 75 mg by mouth 2 (Two) Times a Day.   2022 at    • lisinopril-hydrochlorothiazide (PRINZIDE,ZESTORETIC) 10-12.5 MG per tablet Take 1 tablet by mouth Daily.   2022 at    • montelukast (SINGULAIR) 10 MG tablet Take 10 mg by mouth Every Night.      • tadalafil (ADCIRCA) 20 MG tablet tablet Take 20 mg by mouth 1 (One) Time Per Week.   Past Month at Unknown time       Scheduled Meds:enoxaparin, 40 mg, Subcutaneous, Daily  montelukast, 10 mg, Oral, Nightly  pantoprazole, 40 mg, Intravenous, Q12H  potassium chloride, 20 mEq, Oral, Daily  sodium chloride, 10 mL, Intravenous, Q12H      Continuous Infusions:lactated ringers, 125 mL/hr, Last Rate: 125 mL/hr (22)      PRN Meds:.•  acetaminophen **OR** acetaminophen **OR** acetaminophen  •  aluminum-magnesium hydroxide-simethicone  •  hydrALAZINE  •  melatonin  •  Morphine  •  nitroglycerin  •  ondansetron **OR** ondansetron  •  sodium chloride  •  sodium chloride    ALLERGIES:  Patient has no known allergies.    ROS:  Review of Systems   Gastrointestinal: Positive for abdominal pain, diarrhea, nausea and vomiting. Negative  "for abdominal distention, anal bleeding, blood in stool, constipation and rectal pain.     The following systems were reviewed and negative;   Constitution:  No fevers, chills, no unintentional weight loss  Skin: no rash, no jaundice  Eyes:  No blurry vision, no eye pain  HENT:  No change in hearing or smell  Resp:  No dyspnea or cough  CV:  No chest pain or palpitations  :  No dysuria, hematuria  Musculoskeletal:  No leg cramps or arthralgias  Neuro:  No tremor, no numbness  Psych:  No depression or confusion    Objective Resting in the hospital bed.  NAD.  Appears well.  Family is at bedside.    Vital Signs:   Vitals:    04/09/22 0500 04/09/22 0508 04/09/22 1131 04/09/22 2004   BP: 117/74  114/73 107/67   BP Location: Right arm  Right arm Right arm   Patient Position: Sitting  Lying Lying   Pulse: 67  65 69   Resp:   16 16   Temp: 97.5 °F (36.4 °C)  98.5 °F (36.9 °C) 97.8 °F (36.6 °C)   TempSrc: Oral  Oral Oral   SpO2: 98%  94% 95%   Weight:  90.6 kg (199 lb 11.8 oz)     Height:  177.8 cm (70\")         Physical Exam:   General Appearance:    Awake and alert, in no acute distress   Head:    Normocephalic, without obvious abnormality, atraumatic   Eyes:            Conjunctivae normal, anicteric sclerae, pupils equal   Ears:    Ears appear intact with no abnormalities noted   Throat:   No oral lesions, no thrush, oral mucosa moist   Neck:   Supple, no JVD   Lungs:     respirations regular, even and unlabored       Chest Wall:    No abnormalities observed   Abdomen:     Normal bowel sounds, soft, nontender, no rebound or guarding, nondistended, no hepatosplenomegaly   Rectal:     Deferred   Extremities:   Moves all extremities, no edema, no cyanosis   Pulses:   Pulses palpable and equal bilaterally   Skin:   No rash, no jaundice, normal palpation   Lymph nodes:   No cervical, supraclavicular or submandibular palpable adenopathy   Neurologic:   Cranial nerves 2 - 12 grossly intact, no asterixis       Results " Review:   I reviewed the patient's labs and imaging.  Lab Results (last 24 hours)     Procedure Component Value Units Date/Time    Basic Metabolic Panel [[id number]]  (Abnormal) Collected: 04/09/22 0637    Specimen: Blood Updated: 04/09/22 0816     Glucose 105 mg/dL      BUN 10 mg/dL      Creatinine 0.94 mg/dL      Sodium 141 mmol/L      Potassium 4.0 mmol/L      Chloride 106 mmol/L      CO2 26.0 mmol/L      Calcium 9.1 mg/dL      BUN/Creatinine Ratio 10.6     Anion Gap 9.0 mmol/L      eGFR 92.2 mL/min/1.73      Comment: National Kidney Foundation and American Society of Nephrology (ASN) Task Force recommended calculation based on the Chronic Kidney Disease Epidemiology Collaboration (CKD-EPI) equation refit without adjustment for race.       Narrative:      GFR Normal >60  Chronic Kidney Disease <60  Kidney Failure <15      CBC Auto Differential [[id number]]  (Abnormal) Collected: 04/09/22 0637    Specimen: Blood Updated: 04/09/22 0752     WBC 13.50 10*3/mm3      RBC 4.07 10*6/mm3      Hemoglobin 12.9 g/dL      Hematocrit 37.8 %      MCV 92.9 fL      MCH 31.7 pg      MCHC 34.1 g/dL      RDW 13.9 %      RDW-SD 45.5 fl      MPV 9.3 fL      Platelets 230 10*3/mm3      Neutrophil % 77.1 %      Lymphocyte % 14.6 %      Monocyte % 6.1 %      Eosinophil % 1.4 %      Basophil % 0.8 %      Neutrophils, Absolute 10.40 10*3/mm3      Lymphocytes, Absolute 2.00 10*3/mm3      Monocytes, Absolute 0.80 10*3/mm3      Eosinophils, Absolute 0.20 10*3/mm3      Basophils, Absolute 0.10 10*3/mm3      nRBC 0.0 /100 WBC     COVID-19,CEPHEID/VARUN,COR/ANNA/PAD/CHARLES IN-HOUSE(OR EMERGENT/ADD-ON),NP SWAB IN TRANSPORT MEDIA 3-4 HR TAT, RT-PCR - Swab, Nasopharynx [[id number]]  (Normal) Collected: 04/09/22 0431    Specimen: Swab from Nasopharynx Updated: 04/09/22 0458     COVID19 Not Detected    Narrative:      Fact sheet for providers: https://www.fda.gov/media/338311/download     Fact sheet for patients:  https://www.fda.gov/media/368100/download  Fact sheet for providers: https://www.fda.gov/media/298836/download    Fact sheet for patients: https://www.fda.gov/media/485847/download    Test performed by PCR.    Urine Drug Screen - Urine, Clean Catch [[id number]]  (Abnormal) Collected: 04/09/22 0256    Specimen: Urine, Clean Catch Updated: 04/09/22 0352     Amphet/Methamphet, Screen Negative     Barbiturates Screen, Urine Negative     Benzodiazepine Screen, Urine Negative     Cocaine Screen, Urine Negative     Opiate Screen Positive     THC, Screen, Urine Negative     Methadone Screen, Urine Negative     Oxycodone Screen, Urine Negative    Narrative:      Negative Thresholds Per Drugs Screened:    Amphetamines                 500 ng/ml  Barbiturates                 200 ng/ml  Benzodiazepines              100 ng/ml  Cocaine                      300 ng/ml  Methadone                    300 ng/ml  Opiates                      300 ng/ml  Oxycodone                    100 ng/ml  THC                           50 ng/ml    The Normal Value for all drugs tested is negative. This report includes final unconfirmed screening results to be used for medical treatment purposes only. Unconfirmed results must not be used for non-medical purposes such as employment or legal testing. Clinical consideration should be applied to any drug of abuse test, particularly when unconfirmed results are used.          All urine drugs of abuse requests without chain of custody are for medical screening purposes only.  False positives are possible.      Ethanol [[id number]] Collected: 04/09/22 0042    Specimen: Blood Updated: 04/09/22 0337     Ethanol % <0.010 %     Narrative:      Plasma Ethanol Clinical Symptoms:    ETOH (%)               Clinical Symptom  .01-.05              No apparent influence  .03-.12              Euphoria, Diminished judgment and attention   .09-.25              Impaired comprehension, Muscle incoordination  .18-.30               Confusion, Staggered gait, Slurred speech  .25-.40              Markedly decreased response to stimuli, unable to stand or                        walk, vomitting, sleep or stupor  .35-.50              Comatose, Anesthesia, Subnormal body temperature        Magnesium [[id number]]  (Normal) Collected: 04/09/22 0042    Specimen: Blood Updated: 04/09/22 0319     Magnesium 1.8 mg/dL     Urinalysis With Microscopic If Indicated (No Culture) - Urine, Clean Catch [[id number]]  (Abnormal) Collected: 04/09/22 0256    Specimen: Urine, Clean Catch Updated: 04/09/22 0313     Color, UA Yellow     Appearance, UA Clear     pH, UA 5.5     Specific Gravity, UA 1.046     Glucose, UA Negative     Ketones, UA Trace     Bilirubin, UA Negative     Blood, UA Negative     Protein, UA Negative     Leuk Esterase, UA Negative     Nitrite, UA Negative     Urobilinogen, UA 0.2 E.U./dL    Narrative:      Urine microscopic not indicated.    [place] Draw [[id number]] Collected: 04/09/22 0042    Specimen: Blood Updated: 04/09/22 0147    Narrative:      The following orders were created for panel order [place] Draw.  Procedure                               Abnormality         Status                     ---------                               -----------         ------                     Green Top (Gel)[[id number]]                                  Final result               Lavender Top[[id number]]                                     Final result               Gold Top - SST[[id number]]                                   Final result               Light Blue Top[[id number]]                                   Final result                 Please view results for these tests on the individual orders.    Lavender Top [[id number]] Collected: 04/09/22 0042    Specimen: Blood Updated: 04/09/22 0147     Extra Tube hold for add-on     Comment: Auto resulted       Gold Top - SST [[id number]] Collected: 04/09/22 0042    Specimen: Blood Updated: 04/09/22 0147     Extra  Tube Hold for add-ons.     Comment: Auto resulted.       Light Blue Top [[id number]] Collected: 04/09/22 0042    Specimen: Blood Updated: 04/09/22 0147     Extra Tube hold for add-on     Comment: Auto resulted       Lipase [[id number]]  (Abnormal) Collected: 04/09/22 0042    Specimen: Blood Updated: 04/09/22 0118     Lipase 1,611 U/L     Green Top (Gel) [[id number]] Collected: 04/09/22 0042    Specimen: Blood Updated: 04/09/22 0114     Extra Tube --    Comprehensive Metabolic Panel [[id number]]  (Abnormal) Collected: 04/09/22 0042    Specimen: Blood Updated: 04/09/22 0111     Glucose 113 mg/dL      BUN 11 mg/dL      Creatinine 0.94 mg/dL      Sodium 142 mmol/L      Potassium 3.3 mmol/L      Chloride 107 mmol/L      CO2 24.0 mmol/L      Calcium 8.9 mg/dL      Total Protein 6.6 g/dL      Albumin 4.10 g/dL      ALT (SGPT) 16 U/L      AST (SGOT) 24 U/L      Alkaline Phosphatase 42 U/L      Total Bilirubin 0.5 mg/dL      Globulin 2.5 gm/dL      A/G Ratio 1.6 g/dL      BUN/Creatinine Ratio 11.7     Anion Gap 11.0 mmol/L      eGFR 92.2 mL/min/1.73      Comment: National Kidney Foundation and American Society of Nephrology (ASN) Task Force recommended calculation based on the Chronic Kidney Disease Epidemiology Collaboration (CKD-EPI) equation refit without adjustment for race.       Narrative:      GFR Normal >60  Chronic Kidney Disease <60  Kidney Failure <15      Troponin [[id number]]  (Normal) Collected: 04/09/22 0042    Specimen: Blood Updated: 04/09/22 0111     Troponin T <0.010 ng/mL     Narrative:      Troponin T Reference Range:  <= 0.03 ng/mL-   Negative for AMI  >0.03 ng/mL-     Abnormal for myocardial necrosis.  Clinicians would have to utilize clinical acumen, EKG, Troponin and serial changes to determine if it is an Acute Myocardial Infarction or myocardial injury due to an underlying chronic condition.       Results may be falsely decreased if patient taking Biotin.      CBC & Differential [[id number]]  (Abnormal)  Collected: 04/09/22 0042    Specimen: Blood Updated: 04/09/22 0057    Narrative:      The following orders were created for panel order CBC & Differential.  Procedure                               Abnormality         Status                     ---------                               -----------         ------                     CBC Auto Differential[[id number]]        Abnormal            Final result                 Please view results for these tests on the individual orders.    CBC Auto Differential [[id number]]  (Abnormal) Collected: 04/09/22 0042    Specimen: Blood Updated: 04/09/22 0057     WBC 11.10 10*3/mm3      RBC 4.36 10*6/mm3      Hemoglobin 13.8 g/dL      Hematocrit 40.1 %      MCV 92.1 fL      MCH 31.7 pg      MCHC 34.4 g/dL      RDW 13.9 %      RDW-SD 45.1 fl      MPV 8.8 fL      Platelets 282 10*3/mm3      Neutrophil % 84.3 %      Lymphocyte % 11.0 %      Monocyte % 2.6 %      Eosinophil % 1.6 %      Basophil % 0.5 %      Neutrophils, Absolute 9.30 10*3/mm3      Lymphocytes, Absolute 1.20 10*3/mm3      Monocytes, Absolute 0.30 10*3/mm3      Eosinophils, Absolute 0.20 10*3/mm3      Basophils, Absolute 0.10 10*3/mm3      nRBC 0.0 /100 WBC           Imaging Results (Last 24 Hours)     Procedure Component Value Units Date/Time    US Liver [[id number]] Collected: 04/09/22 0652     Updated: 04/09/22 0656    Narrative:      EXAMINATION: RIGHT UPPER QUADRANT ULTRASOUND      DATE OF EXAM: 4/9/2022 5:20 AM    HISTORY: Right upper quadrant pain.     COMPARISON EXAM: CT examination performed earlier the same date.    FINDINGS:    Liver: The liver is normal in size and echotexture without focal mass or intrahepatic biliary ductal dilatation.      Gallbladder: The gallbladder is distended and there is some gallbladder wall thickening up to 5 mm. No radiopaque stones are identified.    Common bile duct: Measures 6 mm; this is within normal limits.    Pancreas: Findings of pancreatitis seen on comparison CT examination  cannot be seen on this study.    Right kidney: Right kidney appears normal but was not measured.    Ascites: None.        Impression:        There is some gallbladder wall thickening up to 5 mm. This is likely reflective of the patient's overall fluid status. The pancreas is not seen well enough by ultrasound to evaluate for the presence of pancreatitis.    Electronically signed by:  Marcel Morton M.D.    4/9/2022 4:54 AM    CT Abdomen Pelvis With Contrast [[id number]] Collected: 04/09/22 0252     Updated: 04/09/22 0304    Narrative:      EXAMINATION: CT ABDOMEN AND PELVIS WITH IV CONTRAST   April 09, 2022    INDICATION: Abdominal pain    COMPARISON: None available    PROCEDURE:   Axial CT of the abdomen and pelvis was performed following the intravenous administration of 100 ml Isovue 370.  Sagittal and coronal reformatted images were also  provided.  CT dose lowering techniques were used, to include: automated   exposure control, adjustment for patient size, and or use of iterative reconstruction.    FINDINGS:    LOWER CHEST :  Normal.    ABDOMEN:    Liver :  Normal.    Gallbladder and bile ducts:  Normal.    Spleen:  Normal.    Pancreas:  There is mild stranding around the pancreas..    Adrenal glands:  Normal.    Kidneys and ureters:  Normal. No masses or inflammatory process.  No urolithiasis.    Aorta/IVC:  Aorta normal. No aortic aneurysm or dissection.  IVC normal.    Lymph nodes:  No significant lymphadenopathy.    Stomach: Normal    Bowel: No obstruction free air, or ascites.  No mucosal thickening.  There is a 2.8 cm posterior duodenal diverticulum.    Appendix: Normal.    Peritoneum/Mesentery: Normal.    Abdominal wall: Normal.    PELVIS:    Urinary bladder: Normal.    Reproductive organs: Normal.    Lymph Nodes: Normal.    BONES:  Unremarkable.    ADDITIONAL SIGNIFICANT FINDINGS:  None.        Impression:      1.  There is very mild peripancreatic edema suggestive of mild acute pancreatitis.  2.   There is a 2.8 cm posterior duodenal diverticulum.    Electronically signed by:  Shayan Farmer M.D.    4/9/2022 1:03 AM             ASSESSMENT AND PLAN:  Mild acute pancreatitis consider related to alcohol intake  Acute epigastric abdominal pain consider related to pancreatitis could also have gastritis or an ulcer due to daily NSAID intake  Hypokalemia RESOLVED  Abnormal CT showing mild acute pancreatitis as well as duodenal diverticulum  Hypertension  Excessive NSAID intake  Family history of colon cancer    PLAN:  Okay for clear liquid diet  Discussed taking Tylenol for pain and avoiding all NSAIDs.  Discussed using muscle rubs and heat for his hip pain  Complete alcohol cessation  Recommend screening colonoscopy as he has never had one, patient also has a history of brother having colon cancer.  Replace electrolytes as needed  Repeat labs in the morning with lipase.  Will consider increasing diet if he is doing well.      I discussed the patient's findings and my recommendations with the patient.  Samra Kim, MELO  04/09/22  21:11 EDT    Time:

## 2022-04-11 NOTE — CASE MANAGEMENT/SOCIAL WORK
Case Management Discharge Note                Selected Continued Care - Discharged on 4/10/2022 Admission date: 4/9/2022 - Discharge disposition: Home or Self Care       Transportation Services  Private: Car    Final Discharge Disposition Code: 01 - home or self-care

## 2022-04-11 NOTE — PAYOR COMM NOTE
"PA FORM WITH CLINICALS FOR INPATIENT PRECERT:                    AUTHORIZATION PENDING:   PLEASE CALL OR FAX DETERMINATION TO CONTACT BELOW. THANK YOU.        Stacie Hernandez RN MSN  /UR  [facility]  [phone] office  [phone] fax  [email or web address]    [facility]  NPI: [id number]  Tax: [id number]            Jerson Cox (61 y.o. Male)             Date of Birth   1960    Social Security Number       Address   [address] [place], [place] [address]DE [facility] [place] [address]    Home Phone   [phone]    MRN   [id number]       Hinduism   Denominational    Marital Status                               Admission Date   4/9/22    Admission Type   Emergency    Admitting Provider   Oliver Conner MD    Attending Provider       Department, Room/Bed   [facility] 3C MEDICAL INPATIENT, 364/1       Discharge Date   4/10/2022    Discharge Disposition   Home or Self Care    Discharge Destination                               Attending Provider: (none)   Allergies: No Known Allergies    Isolation: None   Infection: COVID (rule out) (04/09/22)   Code Status: Prior   Advance Care Planning Activity    Ht: 177.8 cm (70\")   Wt: 91.8 kg (202 lb 6.1 oz)    Admission Cmt: None   Principal Problem: Idiopathic acute pancreatitis without infection or necrosis [K85.00]                 Active Insurance as of 4/9/2022     Primary Coverage     Payor Plan Insurance Group Employer/Plan Group    [facility] [employer] [facility] [employer] BLUE [facility] PPO [id number]     Payor Plan Address Payor Plan Phone Number Payor Plan Fax Number Effective Dates    [address] [phone]  3/19/2017 - None Entered    [facility] [id number]       Subscriber Name Subscriber Birth Date Member ID       JERSON COX 1960 [id number]                 Emergency Contacts      (Rel.) Home Phone Work Phone Mobile Phone    GISSELL COX (Spouse) [phone] -- [phone]    OSIEL BARNETT " (Daughter) [phone] -- --        22 0408  Inpatient Admission  Once     Completed     Level of Care: Med/Surg    Diagnosis: Epigastric pain [894015]    Admitting Physician: RADHA TORRES [[id number]]    Attending Physician: RADHA TORRES [[id number]]    Certification: I Certify That Inpatient Hospital Services Are Medically Necessary For Greater Than 2 Midnights                   History & Physical      Radha Torres MD at 22 044              [facility] Medicine Services      Patient Name: Jerson Cox  : 1960  MRN: [id number]  Primary Care Physician:  Lin Ching APRN  Date of admission: 2022      Subjective      Chief Complaint: Abdominal pain    History of Present Illness: Jerson Cox is a 61 y.o. male who presented to [facility] on 2022 with a past medical history of arthritis possible rheumatoid arthritis LUPIS is negative but rheumatoid factor was mildly elevated, hypertension and NSAID abuse.  The patient has been having abdominal pain for about the last year.  But it comes and goes but tonight after he ate the pain became so severe.  It was piercing and upper epigastric and was not getting better so he came to the emergency room.  In the emergency room his lipase was elevated to 1600.  CT scan was performed and indicated acute pancreatitis.  So the patient will be admitted for pancreatitis.  The cause could be from alcohol he drinks most days of the week and there is some question about how much.  CT did not reveal any gallstones but will get a right upper quadrant ultrasound to determine if gallstones are the cause.  The patient also takes multiple NSAIDs.  He has a prescription for Mobic 7.5 and diclofenac 75 mg and then he takes ibuprofen periodically these can be associated with pancreatitis as well.  And for that matter lisinopril and hydrochlorothiazide can also cause pancreatitis.      ROS acute piercing abdominal pain, was worse  after eating, only resolved after coming to the hospital receiving narcotics, patient denies fever, chills, chest pain, shortness of breath, rash, patient has chronic joint pains but no different than normal, cough, congestion, leg swelling changes in bowel or bladder nausea or vomiting headache and the rest of 15 essential review of systems have been reviewed and are negative    Personal History     Past Medical History:   Diagnosis Date   • Erectile dysfunction    • Hypertension    • Joint pain        Past Surgical History:   Procedure Laterality Date   • CERVICAL FUSION POSTERIOR WITH WIRING     • MEDIAL COLLATERAL LIGAMENT REPAIR, KNEE Bilateral        Family History: family history is not on file. Otherwise pertinent FHx was reviewed and not pertinent to current issue.    Social History:  reports that he quit smoking about 14 years ago. He has a 37.50 pack-year smoking history. He has quit using smokeless tobacco.  His smokeless tobacco use included chew. He reports current alcohol use of about 4.0 standard drinks of alcohol per week. He reports that he does not use drugs.    Home Medications:  Prior to Admission Medications     Prescriptions Last Dose Informant Patient Reported? Taking?    diclofenac (VOLTAREN) 75 MG EC tablet 4/8/2022  Yes Yes    Take 75 mg by mouth 2 (Two) Times a Day.    lisinopril-hydrochlorothiazide (PRINZIDE,ZESTORETIC) 10-12.5 MG per tablet 4/8/2022 Medication Bottle Yes Yes    Take 1 tablet by mouth Daily.    meloxicam (MOBIC) 7.5 MG tablet Past Month Medication Bottle Yes Yes    Take 7.5 mg by mouth Daily.    tadalafil (ADCIRCA) 20 MG tablet tablet Past Month  Yes Yes    Take 40 mg by mouth Daily.            Allergies:  No Known Allergies    Objective      Vitals:   Temp:  [97.7 °F (36.5 °C)] 97.7 °F (36.5 °C)  Heart Rate:  [55-70] 58  Resp:  [15-20] 15  BP: (102-146)/(60-90) 138/83    Physical Exam   General no apparent distress well-developed well-groomed  Head  atraumatic  Oropharynx membranes moist no erythema  Nares no nasal discharge  Neck no thyromegaly or lymphadenopathy  Pulmonary lungs clear to auscultation bilaterally no accessory muscle use  Cardiac regular rate and rhythm cannot appreciate any murmurs no peripheral edema  Abdomen mild upper epigastric tenderness no Mercado sign no guarding no rebound no distention bowel sounds present  Extremities well-developed no cyanosis no edema  Neuro cranial nerves II through XII intact, no tremor  Psych patient is alert and oriented x4 answers all questions appropriately appropriate mood and effect    Result Review    Result Review:  I have personally reviewed the results from the time of this admission to 4/9/2022 04:46 EDT and agree with these findings:  [x]  Laboratory  []  Microbiology  [x]  Radiology  []  EKG/Telemetry   []  Cardiology/Vascular   []  Pathology  []  Old records  []  Other:  Most notable findings include:   Sodium 142, potassium 3.3, bicarb 24, creatinine 0.9, BUN 11, glucose 113, CMP was fairly unremarkable  Lipase was 1600  Hemoglobin 14, WBCs 11,000, platelets 282  Liver ultrasound pending      IMPRESSION: CTA of abdomen  1.  There is very mild peripancreatic edema suggestive of mild acute pancreatitis.  2.  There is a 2.8 cm posterior duodenal diverticulum.      Assessment/Plan        Active Hospital Problems:  Active Hospital Problems    Diagnosis    • Epigastric pain      Plan:   1.  Acute pancreatitis -ultrasound has been ordered to check for gallstones .CT scan did not show any stones or actually dilated gallbladder.  If positive will contact general surgery for possible cholecystectomy.  If there is any indication for possible bile duct stone will consult GI for possible ERCP.  If this work-up proves to be negative other likely causes are alcohol he does drink most days of the week according to him but not excessive but is a distinct possibility.  Another distinct possibility is NSAIDs induced  pancreatitis he is taking prescribed Mobic and diclofenac daily.  He is also taking ibuprofen may be 3 sometimes 4 times a week on top of these.  This could be another potential cause.  Course almost all drugs are associated with the possibility of pancreatitis and that included his blood pressure medications HCTZ and lisinopril.  The highest probability if is not gallstones most likely is the alcohol and NSAIDs.  Had long discussion about both of these tonight and how if he is taking one of the NSAIDs he can take the others and the importance of cessation from alcohol.  Regardless when patient's pain is under control and able to take sufficient input by mouth he can be discharged  2.  Alcohol abuse -counseled on cessation  3.  Essential hypertension have him on hydralazine now as needed once he stabilizes can reinitiate his home blood pressure medication.  If however after stopping drinking NSAIDs and no gallstones may have to change his blood pressure medications  4.  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  The patient does have a appointment with a rheumatologist and I think that will be an excellent idea.  LUPIS is negative.  Regardless needs to probably have physical therapy on an outpatient basis to help him with his pain and avoid excess NSAIDs may be try topical NSAIDs  5.  Abdominal pain although most likely it is pancreatitis he may have some gastritis from all of the NSAIDs.  Recommend setting the patient up with GI on an outpatient basis for an EGD and if he has not had his colonoscopy get that as well.  Unless patient condition worsens this can be an outpatient work-up  DVT prophylaxis:  Medical DVT prophylaxis orders are present.    CODE STATUS:    Level Of Support Discussed With: Patient  Code Status (Patient has no pulse and is not breathing): CPR  (Attempt to Resuscitate)  Medical Interventions (Patient has pulse or is breathing): Full Support    Admission Status:  I believe this patient meets inpatient status.    I discussed the patient's findings and my recommendations with patient and family.    This patient has been examined wearing appropriate Personal Protective Equipment and discussed with . 04/09/22      Signature:     Electronically signed by Oliver Conner MD at 04/09/22 0528          Emergency Department Notes      Shayy Posadas RN at 04/09/22 0034        Reports intermittent epigastric pain x1 year, tonight had just eaten dinner and was watching TV when sharp pain started in epigastrium and was increasing in severity. Pain did not radiate, when he called EMS he states that he was able to relieve the pain by applying pressure to area, reports some nausea, no vomiting.     Electronically signed by Shayy Posadas RN at 04/09/22 0036     Katherine Jalloh PA at 04/09/22 0045     Attestation signed by Matthieu Benoit MD at 04/09/22 0434        SUPERVISE: For this patient encounter, I reviewed the APC's documentation, treatment plan, and medical decision making.  Matthieu Benoit MD 4/9/2022 04:34 EDT                         Subjective   Patient is a 61-year-old male who presents to the ED with complaints of epigastric abdominal pain that is been intermittent over the past year or so but became very severe tonight.  Patient states the pain started after eating.  He describes it as a sharp type pain that waxes and wanes in intensity.  He currently rates it as moderate in severity.  Patient states the pain is nonradiating from the area.  Patient reports associated nausea and 2 episodes of emesis earlier this evening without any hematemesis.  Patient reports intermittent diarrhea and constipation which is normal for him.  He denies any black or bloody stools.  No urinary symptoms.  No recent travel or known sick contacts.  No fever body aches or  chills.  No significant chest pain or shortness of breath.  He states he tried taking some over-the-counter Tums with minimal relief of his symptoms.  Patient denies any pertinent abdominal surgical history.  States he does take diclofenac and meloxicam for chronic hip pain.  Patient denies any other alleviating or exacerbating factors.      History provided by:  Patient      Review of Systems   Constitutional: Negative.    Eyes: Negative for photophobia and visual disturbance.   Respiratory: Negative.    Cardiovascular: Negative.    Gastrointestinal: Positive for abdominal pain, constipation, diarrhea, nausea and vomiting. Negative for abdominal distention and blood in stool.   Genitourinary: Negative.    Musculoskeletal: Negative.    Skin: Negative.    Neurological: Negative.    Hematological: Negative.        Past Medical History:   Diagnosis Date   • Erectile dysfunction    • Hypertension    • Joint pain        No Known Allergies    Past Surgical History:   Procedure Laterality Date   • CERVICAL FUSION POSTERIOR WITH WIRING     • MEDIAL COLLATERAL LIGAMENT REPAIR, KNEE Bilateral        History reviewed. No pertinent family history.    Social History     Socioeconomic History   • Marital status:    Tobacco Use   • Smoking status: Former Smoker     Packs/day: 1.50     Years: 25.00     Pack years: 37.50     Quit date: 2007     Years since quittin.9   • Smokeless tobacco: Former User     Types: Chew   Substance and Sexual Activity   • Alcohol use: Yes     Alcohol/week: 4.0 standard drinks     Types: 4 Cans of beer per week     Comment: Reports drinking 1 beer every other day more socially   • Drug use: Never           Objective   Physical Exam  Vitals and nursing note reviewed.   Constitutional:       General: He is not in acute distress.     Appearance: Normal appearance. He is well-developed. He is not ill-appearing, toxic-appearing or diaphoretic.   HENT:      Head: Normocephalic and atraumatic.  "     Mouth/Throat:      Mouth: Mucous membranes are moist.      Pharynx: Oropharynx is clear.   Eyes:      General: No scleral icterus.     Extraocular Movements: Extraocular movements intact.      Pupils: Pupils are equal, round, and reactive to light.   Cardiovascular:      Rate and Rhythm: Normal rate and regular rhythm.      Heart sounds: No murmur heard.    No friction rub. No gallop.   Pulmonary:      Effort: Pulmonary effort is normal. No tachypnea, accessory muscle usage or respiratory distress.      Breath sounds: Normal breath sounds. No stridor. No decreased breath sounds, wheezing, rhonchi or rales.   Chest:      Chest wall: No mass, deformity, tenderness or crepitus.   Abdominal:      General: There is no distension.      Palpations: Abdomen is soft. There is hepatomegaly. There is no splenomegaly or mass.      Tenderness: There is abdominal tenderness in the epigastric area. There is no right CVA tenderness, left CVA tenderness, guarding or rebound. Negative signs include Mrecado's sign and McBurney's sign.      Hernia: No hernia is present.   Musculoskeletal:      Cervical back: Normal range of motion and neck supple.   Skin:     General: Skin is warm.      Capillary Refill: Capillary refill takes less than 2 seconds.      Findings: No rash.   Neurological:      Mental Status: He is alert and oriented to person, place, and time.   Psychiatric:         Mood and Affect: Mood normal.         Behavior: Behavior normal.         Procedures          ED Course      Blood pressure 116/69, pulse 64, temperature 97.7 °F (36.5 °C), temperature source Oral, resp. rate 15, height 182.9 cm (72\"), weight 81.6 kg (180 lb), SpO2 96 %.    Medications   sodium chloride 0.9 % flush 10 mL (has no administration in time range)   potassium chloride (K-DUR,KLOR-CON) CR tablet 20 mEq (has no administration in time range)   Morphine sulfate (PF) injection 4 mg (4 mg Intravenous Given 4/9/22 0055)   ondansetron (ZOFRAN) injection " 4 mg (4 mg Intravenous Given 4/9/22 9452)   iopamidol (ISOVUE-370) 76 % injection 100 mL (100 mL Intravenous Given 4/9/22 7070)   Morphine sulfate (PF) injection 4 mg (4 mg Intravenous Given 4/9/22 3410)     Labs Reviewed   COMPREHENSIVE METABOLIC PANEL - Abnormal; Notable for the following components:       Result Value    Glucose 113 (*)     Potassium 3.3 (*)     All other components within normal limits    Narrative:     GFR Normal >60  Chronic Kidney Disease <60  Kidney Failure <15     LIPASE - Abnormal; Notable for the following components:    Lipase 1,611 (*)     All other components within normal limits   URINALYSIS W/ MICROSCOPIC IF INDICATED (NO CULTURE) - Abnormal; Notable for the following components:    Specific Gravity, UA 1.046 (*)     Ketones, UA Trace (*)     All other components within normal limits    Narrative:     Urine microscopic not indicated.   CBC WITH AUTO DIFFERENTIAL - Abnormal; Notable for the following components:    WBC 11.10 (*)     Neutrophil % 84.3 (*)     Lymphocyte % 11.0 (*)     Monocyte % 2.6 (*)     Neutrophils, Absolute 9.30 (*)     All other components within normal limits   TROPONIN (IN-HOUSE) - Normal    Narrative:     Troponin T Reference Range:  <= 0.03 ng/mL-   Negative for AMI  >0.03 ng/mL-     Abnormal for myocardial necrosis.  Clinicians would have to utilize clinical acumen, EKG, Troponin and serial changes to determine if it is an Acute Myocardial Infarction or myocardial injury due to an underlying chronic condition.       Results may be falsely decreased if patient taking Biotin.     COVID-19,CEPHEID/VARUN,COR/ANNA/PAD/CHARLES IN-HOUSE,NP SWAB IN TRANSPORT MEDIA 3-4 HR TAT, RT-PCR   RAINBOW DRAW    Narrative:     The following orders were created for panel order [facility] Draw.  Procedure                               Abnormality         Status                     ---------                               -----------         ------                     Green Top  (Gel)[[id number]]                                  Final result               Lavender Top[[id number]]                                     Final result               Gold Top - SST[[id number]]                                   Final result               Light Blue Top[[id number]]                                   Final result                 Please view results for these tests on the individual orders.   MAGNESIUM   GREEN TOP   LAVENDER TOP   GOLD TOP - SST   LIGHT BLUE TOP   CBC AND DIFFERENTIAL    Narrative:     The following orders were created for panel order CBC & Differential.  Procedure                               Abnormality         Status                     ---------                               -----------         ------                     CBC Auto Differential[[id number]]        Abnormal            Final result                 Please view results for these tests on the individual orders.     CT Abdomen Pelvis With Contrast    Result Date: 4/9/2022  1.  There is very mild peripancreatic edema suggestive of mild acute pancreatitis. 2.  There is a 2.8 cm posterior duodenal diverticulum. Electronically signed by:  Shayan Farmer M.D.  4/9/2022 1:03 AM                                                 MDM  Number of Diagnoses or Management Options  Acute pancreatitis, unspecified complication status, unspecified pancreatitis type  Epigastric pain  Diagnosis management comments: Chart Review:  -No pertinent ED urgent care visits to review at the past 6 months  Comorbidity: As per past medical history  Differentials: DKA, intra-abdominal infection, dissection, peritonitis, peptic ulcer disease, pancreatitis, hepatitis, ischemic bowel, bowel obstruction, appendicitis     ;this list is not all inclusive and does not constitute the entirety of considered causes  ECG: Not warranted  Labs: As above  Imaging: Was interpreted by physician and reviewed by myself:    Disposition/Treatment:  Appropriate PPE was worn during  exam and throughout all encounters with the patient.  When the ED IV was placed and labs were obtained patient placed on proper monitors he was afebrile and appeared nontoxic presents to the ED with complaints of epigastric abdominal pain.  Patient is given morphine Zofran and fluids for his pain and nausea.    Lab results showed white blood cell count 11.1 patient is hemodynamically stable.  Lipase elevated at 1611.  Metabolic panel showed glucose 113 but no signs of DKA with an anion gap of 11 bicarb of 24.  Patient's kidney and liver function unremarkable total bilirubin normal.  Potassium 3.3.  Potassium was replaced while in the ED. urinalysis unremarkable for UTI.  Magnesium unremarkable.     CT of abdomen pelvis was significant for acute pancreatitis.  This is new diagnosis for the patient.  Patient states he does drink a beer about every other day could be contributing factor to his acute pancreatitis.  Patient did require additional morphine while in the ED for pain control.  Lab results and findings were discussed with the patient and family at bedside patient will be admitted to hospitalist group for IV hydration and pain control for his pancreatitis.  Patient was in agreement with plan.  Patient will be admitted to hospitalist group.  Spoke to Dr. Torres who agreed for admission.  Urine drug screen and  EtOH added on and pending upon admission. Patient was also given additional LR while in the ED.        Amount and/or Complexity of Data Reviewed  Clinical lab tests: reviewed        Final diagnoses:   Epigastric pain   Acute pancreatitis, unspecified complication status, unspecified pancreatitis type       ED Disposition  ED Disposition     ED Disposition   Decision to Admit    Condition   --    Comment   Level of Care: Telemetry [5]   Admitting Physician: RADHA TORRES [[id number]]   Attending Physician: RADHA TORRES [[id number]]               No follow-up provider specified.       Medication List      No  changes were made to your prescriptions during this visit.          Katherine Jalloh PA  22 0316       Katherine Jalloh PA  22 0321       Katherine Jalloh PA  22 0326      Electronically signed by Matthieu Benoit MD at 22 0434          Physician Progress Notes (all)      Ariel Link MD at 22 1432                         [facility] Medicine Services        Patient Name: Jerson Cox  : 1960  MRN: [id number]  Primary Care Physician:  Lin Ching APRN  Date of admission: 2022        Subjective       Chief Complaint: Abdominal pain     History of Present Illness: Jerson Cox is a 61 y.o. male who presented to [facility] on 2022 with a past medical history of arthritis possible rheumatoid arthritis LUPIS is negative but rheumatoid factor was mildly elevated, hypertension and NSAID abuse.  The patient has been having abdominal pain for about the last year.  But it comes and goes but tonight after he ate the pain became so severe.  It was piercing and upper epigastric and was not getting better so he came to the emergency room.  In the emergency room his lipase was elevated to 1600.  CT scan was performed and indicated acute pancreatitis.  So the patient will be admitted for pancreatitis.  The cause could be from alcohol he drinks most days of the week and there is some question about how much.  CT did not reveal any gallstones but will get a right upper quadrant ultrasound to determine if gallstones are the cause.  The patient also takes multiple NSAIDs.  He has a prescription for Mobic 7.5 and diclofenac 75 mg and then he takes ibuprofen periodically these can be associated with pancreatitis as well.  And for that matter lisinopril and hydrochlorothiazide can also cause pancreatitis.        ROS acute piercing abdominal pain, was worse after eating, only resolved after coming to the hospital receiving narcotics,  patient denies fever, chills, chest pain, shortness of breath, rash, patient has chronic joint pains but no different than normal, cough, congestion, leg swelling changes in bowel or bladder nausea or vomiting headache and the rest of 15 essential review of systems have been reviewed and are negative   Hospital course  4/9/2022; patient is still endorsing some epigastric pain and tenderness continue to monitor continue n.p.o. consult gastroenterology likely alcoholic pancreatitis  Personal History      Medical History        Past Medical History:   Diagnosis Date   • Erectile dysfunction     • Hypertension     • Joint pain              Surgical History         Past Surgical History:   Procedure Laterality Date   • CERVICAL FUSION POSTERIOR WITH WIRING       • MEDIAL COLLATERAL LIGAMENT REPAIR, KNEE Bilateral              Family History: family history is not on file. Otherwise pertinent FHx was reviewed and not pertinent to current issue.     Social History:  reports that he quit smoking about 14 years ago. He has a 37.50 pack-year smoking history. He has quit using smokeless tobacco.  His smokeless tobacco use included chew. He reports current alcohol use of about 4.0 standard drinks of alcohol per week. He reports that he does not use drugs.     Home Medications:           Prior to Admission Medications      Prescriptions Last Dose Informant Patient Reported? Taking?     diclofenac (VOLTAREN) 75 MG EC tablet 4/8/2022   Yes Yes     Take 75 mg by mouth 2 (Two) Times a Day.     lisinopril-hydrochlorothiazide (PRINZIDE,ZESTORETIC) 10-12.5 MG per tablet 4/8/2022 Medication Bottle Yes Yes     Take 1 tablet by mouth Daily.     meloxicam (MOBIC) 7.5 MG tablet Past Month Medication Bottle Yes Yes     Take 7.5 mg by mouth Daily.     tadalafil (ADCIRCA) 20 MG tablet tablet Past Month   Yes Yes     Take 40 mg by mouth Daily.                Allergies:  No Known Allergies     Objective       Vitals:   Temp:  [97.5 °F (36.4  °C)-98.5 °F (36.9 °C)] 98.5 °F (36.9 °C)  Heart Rate:  [55-70] 65  Resp:  [15-20] 16  BP: (102-146)/(60-90) 114/73     Physical Exam   General no apparent distress well-developed well-groomed lying in the bed comfortable  Head atraumatic  Oropharynx membranes moist no erythema  Nares no nasal discharge  Neck no thyromegaly or lymphadenopathy  Pulmonary lungs clear to auscultation bilaterally no accessory muscle use  Cardiac regular rate and rhythm cannot appreciate any murmurs no peripheral edema  Abdomen mild upper epigastric tenderness no Mercado sign no guarding no rebound no distention bowel sounds present.  Extremities well-developed no cyanosis no edema  Neuro cranial nerves II through XII intact, no tremor  Psych patient is alert and oriented x4 answers all questions appropriately appropriate mood and effect     Result Review    Result Review:  I have personally reviewed the results from the time of this admission to 4/9/2022 04:46 EDT and agree with these findings:  [x]?  Laboratory  []?  Microbiology  [x]?  Radiology  []?  EKG/Telemetry   []?  Cardiology/Vascular   []?  Pathology  []?  Old records  []?  Other:  Most notable findings include:   Sodium 142, potassium 3.3, bicarb 24, creatinine 0.9, BUN 11, glucose 113, CMP was fairly unremarkable  Lipase was 1600  Hemoglobin 14, WBCs 11,000, platelets 282  Liver ultrasound pending        IMPRESSION: CTA of abdomen  1.  There is very mild peripancreatic edema suggestive of mild acute pancreatitis.  2.  There is a 2.8 cm posterior duodenal diverticulum.   CT Abdomen Pelvis With Contrast    Result Date: 4/9/2022  1.  There is very mild peripancreatic edema suggestive of mild acute pancreatitis. 2.  There is a 2.8 cm posterior duodenal diverticulum. Electronically signed by:  Shayan Farmer M.D.  4/9/2022 1:03 AM    US Liver    Result Date: 4/9/2022  There is some gallbladder wall thickening up to 5 mm. This is likely reflective of the patient's overall fluid  status. The pancreas is not seen well enough by ultrasound to evaluate for the presence of pancreatitis. Electronically signed by:  Marcel Morton M.D.  4/9/2022 4:54 AM       Assessment/Plan          Active Hospital Problems:       Active Hospital Problems     Diagnosis     • Epigastric pain        Plan:   1.  Acute pancreatitis likely alcohol induced -ultrasound gallbladder wall thickening 5 mm but likely from fluid status not inflammation .CT scan did not show any stones or actually dilated gallbladder.   consult GI for possible ERCP.  If this work-up proves to be negative other likely causes are alcohol he does drink most days of the week according to him.  2.  Alcohol abuse -counseled on cessation  3.  Essential hypertension have him on hydralazine now as needed hold lisinopril hydrochlorothiazide  4.  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  The patient does have a appointment with a rheumatologist and I think that will be an excellent idea.  LUPIS is negative.  Regardless needs to probably have physical therapy on an outpatient basis to help him with his pain and avoid excess NSAIDs may be try topical NSAIDs  DVT prophylaxis:  Medical DVT prophylaxis orders are present.     CODE STATUS:    Level Of Support Discussed With: Patient  Code Status (Patient has no pulse and is not breathing): CPR (Attempt to Resuscitate)  Medical Interventions (Patient has pulse or is breathing): Full Support     Admission Status:  I believe this patient meets inpatient status.     I discussed the patient's findings and my recommendations with patient and family.     This patient has been examined wearing appropriate Personal Protective Equipment and discussed with . 04/09/22    Electronically signed by Ariel Link MD, 04/09/22, 2:36 PM EDT.        Electronically signed  by Ariel Link MD at 04/09/22 1436          Consult Notes (all)      Julio Samramaxine Jasso APRN at 04/09/22 2111     Attestation signed by KATERINA Helms MD at 04/09/22 2336    The patient was seen and examined with an APRN. I personally performed the majority of the physical exam and the medical decision making.     Subjective:   61-year-old male with history of high blood pressure and alcohol use presenting with epigastric abdominal pain.  Has had intermittent pain for the past year but was severe on the day of admission.  Had some associated nausea with vomiting.  Drinks beer and liquor daily, for years.  Takes ibuprofen several times a week.  Denies history of gallstones.  Denies fevers or chills.    Physical exam notable for:   Abdomen soft, mild epigastric tenderness, nondistended. Otherwise unremarkable physical exam.    Labs and imaging and outpatient records reviewed/ordered.       Assessment  Acute pancreatitis due to alcohol  Epigastric abdominal pain  NSAID use  Family history of colon cancer    Plan  Acute pancreatitis likely due to alcohol versus NSAIDs.  Ultrasound with no gallstones.  Check cholesterol and triglycerides.  Needs to quit drinking alcohol.  Avoid NSAIDs.  Okay for liquid diet and advancing as tolerated.  Hopefully home soon in 1 to 2 days.  Needs a screening colonoscopy as an outpatient.    Electronically signed on 04/09/22 23:32 EDT by RUBÉN Helms MD                    GI CONSULT  NOTE:    Referring Provider:    Dr Link    Chief complaint:   Acute pancreatitis    Subjective    Epigastric pain     History of present illness:    Patient is a 61-year-old male with a history of hypertension and daily alcohol use presented to the ER on 4/9/2021 with complaint of epigastric abdominal pain.  Patient states his abdominal pain has been intermittent over the past year but became at its severest the night he came to the ER.  He described as a sharp abdominal pain with crescendo and  decrescendo type pain.  Patient states the only time he vomited was during the ambulance ride..  Patient states his pain started after drinking a beer and eating grilled cheese with jalapenos.  Patient has been taking diclofenac twice a day, meloxicam as needed and ibuprofen about 3 times a week.  Patient states he has diarrhea about twice a day.  Denies any constipation, abdominal distention.  No melena hematochezia or bright red blood per rectum.  Patient denies any weight loss.  Brother has a history of colon cancer but he has never had a colonoscopy.  States he did Cologuard previously.  Labs: Potassium was 3.3 upon admission and is now 4.  CMP is otherwise normal.  Lipase was 1600 upon admission.  White blood cell counts is 13.5, hemoglobin 12.9 with an MCV of 92.9, platelets are 230.  CT of the abdomen and pelvis showed very mild peripancreatic edema suggestive of mild acute pancreatitis.  2.8 cm posterior duodenal diverticulum.  Right upper quadrant ultrasound shows some gallbladder wall thickening up to 5 mm.  Likely related to patient's overall fluid status.  Pancreas is not well seen.  Common bile duct 6 mm.  No stones identified.  Patient states he drinks 5 days out of 7 but wife states he drinks every day a mixture of mixed drinks as well as beer.    Endo History:  None    Past Medical History:  Past Medical History:   Diagnosis Date   • Erectile dysfunction    • Hypertension    • Joint pain        Past Surgical History:  Past Surgical History:   Procedure Laterality Date   • CERVICAL FUSION POSTERIOR WITH WIRING     • MEDIAL COLLATERAL LIGAMENT REPAIR, KNEE Bilateral        Social History:  Social History     Tobacco Use   • Smoking status: Former Smoker     Packs/day: 1.50     Years: 25.00     Pack years: 37.50     Quit date: 2007     Years since quittin.9   • Smokeless tobacco: Former User     Types: Chew   Substance Use Topics   • Alcohol use: Yes     Alcohol/week: 4.0 standard drinks      Types: 4 Cans of beer per week     Comment: Reports drinking 1 beer every other day more socially   • Drug use: Never       Family History:  History reviewed. No pertinent family history.    Medications:  Medications Prior to Admission   Medication Sig Dispense Refill Last Dose   • diclofenac (VOLTAREN) 75 MG EC tablet Take 75 mg by mouth 2 (Two) Times a Day.   4/8/2022 at 2000   • lisinopril-hydrochlorothiazide (PRINZIDE,ZESTORETIC) 10-12.5 MG per tablet Take 1 tablet by mouth Daily.   4/8/2022 at 2000   • montelukast (SINGULAIR) 10 MG tablet Take 10 mg by mouth Every Night.      • tadalafil (ADCIRCA) 20 MG tablet tablet Take 20 mg by mouth 1 (One) Time Per Week.   Past Month at Unknown time       Scheduled Meds:enoxaparin, 40 mg, Subcutaneous, Daily  montelukast, 10 mg, Oral, Nightly  pantoprazole, 40 mg, Intravenous, Q12H  potassium chloride, 20 mEq, Oral, Daily  sodium chloride, 10 mL, Intravenous, Q12H      Continuous Infusions:lactated ringers, 125 mL/hr, Last Rate: 125 mL/hr (04/09/22 2014)      PRN Meds:.•  acetaminophen **OR** acetaminophen **OR** acetaminophen  •  aluminum-magnesium hydroxide-simethicone  •  hydrALAZINE  •  melatonin  •  Morphine  •  nitroglycerin  •  ondansetron **OR** ondansetron  •  sodium chloride  •  sodium chloride    ALLERGIES:  Patient has no known allergies.    ROS:  Review of Systems   Gastrointestinal: Positive for abdominal pain, diarrhea, nausea and vomiting. Negative for abdominal distention, anal bleeding, blood in stool, constipation and rectal pain.     The following systems were reviewed and negative;   Constitution:  No fevers, chills, no unintentional weight loss  Skin: no rash, no jaundice  Eyes:  No blurry vision, no eye pain  HENT:  No change in hearing or smell  Resp:  No dyspnea or cough  CV:  No chest pain or palpitations  :  No dysuria, hematuria  Musculoskeletal:  No leg cramps or arthralgias  Neuro:  No tremor, no numbness  Psych:  No depression or  "confusion    Objective Resting in the hospital bed.  NAD.  Appears well.  Family is at bedside.    Vital Signs:   Vitals:    04/09/22 0500 04/09/22 0508 04/09/22 1131 04/09/22 2004   BP: 117/74  114/73 107/67   BP Location: Right arm  Right arm Right arm   Patient Position: Sitting  Lying Lying   Pulse: 67  65 69   Resp:   16 16   Temp: 97.5 °F (36.4 °C)  98.5 °F (36.9 °C) 97.8 °F (36.6 °C)   TempSrc: Oral  Oral Oral   SpO2: 98%  94% 95%   Weight:  90.6 kg (199 lb 11.8 oz)     Height:  177.8 cm (70\")         Physical Exam:   General Appearance:    Awake and alert, in no acute distress   Head:    Normocephalic, without obvious abnormality, atraumatic   Eyes:            Conjunctivae normal, anicteric sclerae, pupils equal   Ears:    Ears appear intact with no abnormalities noted   Throat:   No oral lesions, no thrush, oral mucosa moist   Neck:   Supple, no JVD   Lungs:     respirations regular, even and unlabored       Chest Wall:    No abnormalities observed   Abdomen:     Normal bowel sounds, soft, nontender, no rebound or guarding, nondistended, no hepatosplenomegaly   Rectal:     Deferred   Extremities:   Moves all extremities, no edema, no cyanosis   Pulses:   Pulses palpable and equal bilaterally   Skin:   No rash, no jaundice, normal palpation   Lymph nodes:   No cervical, supraclavicular or submandibular palpable adenopathy   Neurologic:   Cranial nerves 2 - 12 grossly intact, no asterixis       Results Review:   I reviewed the patient's labs and imaging.  Lab Results (last 24 hours)     Procedure Component Value Units Date/Time    Basic Metabolic Panel [[id number]]  (Abnormal) Collected: 04/09/22 0637    Specimen: Blood Updated: 04/09/22 0816     Glucose 105 mg/dL      BUN 10 mg/dL      Creatinine 0.94 mg/dL      Sodium 141 mmol/L      Potassium 4.0 mmol/L      Chloride 106 mmol/L      CO2 26.0 mmol/L      Calcium 9.1 mg/dL      BUN/Creatinine Ratio 10.6     Anion Gap 9.0 mmol/L      eGFR 92.2 mL/min/1.73      " Comment: National Kidney Foundation and American Society of Nephrology (ASN) Task Force recommended calculation based on the Chronic Kidney Disease Epidemiology Collaboration (CKD-EPI) equation refit without adjustment for race.       Narrative:      GFR Normal >60  Chronic Kidney Disease <60  Kidney Failure <15      CBC Auto Differential [[id number]]  (Abnormal) Collected: 04/09/22 0637    Specimen: Blood Updated: 04/09/22 0752     WBC 13.50 10*3/mm3      RBC 4.07 10*6/mm3      Hemoglobin 12.9 g/dL      Hematocrit 37.8 %      MCV 92.9 fL      MCH 31.7 pg      MCHC 34.1 g/dL      RDW 13.9 %      RDW-SD 45.5 fl      MPV 9.3 fL      Platelets 230 10*3/mm3      Neutrophil % 77.1 %      Lymphocyte % 14.6 %      Monocyte % 6.1 %      Eosinophil % 1.4 %      Basophil % 0.8 %      Neutrophils, Absolute 10.40 10*3/mm3      Lymphocytes, Absolute 2.00 10*3/mm3      Monocytes, Absolute 0.80 10*3/mm3      Eosinophils, Absolute 0.20 10*3/mm3      Basophils, Absolute 0.10 10*3/mm3      nRBC 0.0 /100 WBC     COVID-19,CEPHEID/VARUN,COR/ANNA/PAD/CHARLES IN-HOUSE(OR EMERGENT/ADD-ON),NP SWAB IN TRANSPORT MEDIA 3-4 HR TAT, RT-PCR - Swab, Nasopharynx [[id number]]  (Normal) Collected: 04/09/22 0431    Specimen: Swab from Nasopharynx Updated: 04/09/22 0458     COVID19 Not Detected    Narrative:      Fact sheet for providers: https://www.fda.gov/media/612931/download     Fact sheet for patients: https://www.fda.gov/media/447762/download  Fact sheet for providers: https://www.fda.gov/media/895757/download    Fact sheet for patients: https://www.fda.gov/media/905773/download    Test performed by PCR.    Urine Drug Screen - Urine, Clean Catch [[id number]]  (Abnormal) Collected: 04/09/22 0256    Specimen: Urine, Clean Catch Updated: 04/09/22 0352     Amphet/Methamphet, Screen Negative     Barbiturates Screen, Urine Negative     Benzodiazepine Screen, Urine Negative     Cocaine Screen, Urine Negative     Opiate Screen Positive     THC, Screen, Urine  Negative     Methadone Screen, Urine Negative     Oxycodone Screen, Urine Negative    Narrative:      Negative Thresholds Per Drugs Screened:    Amphetamines                 500 ng/ml  Barbiturates                 200 ng/ml  Benzodiazepines              100 ng/ml  Cocaine                      300 ng/ml  Methadone                    300 ng/ml  Opiates                      300 ng/ml  Oxycodone                    100 ng/ml  THC                           50 ng/ml    The Normal Value for all drugs tested is negative. This report includes final unconfirmed screening results to be used for medical treatment purposes only. Unconfirmed results must not be used for non-medical purposes such as employment or legal testing. Clinical consideration should be applied to any drug of abuse test, particularly when unconfirmed results are used.          All urine drugs of abuse requests without chain of custody are for medical screening purposes only.  False positives are possible.      Ethanol [[id number]] Collected: 04/09/22 0042    Specimen: Blood Updated: 04/09/22 0337     Ethanol % <0.010 %     Narrative:      Plasma Ethanol Clinical Symptoms:    ETOH (%)               Clinical Symptom  .01-.05              No apparent influence  .03-.12              Euphoria, Diminished judgment and attention   .09-.25              Impaired comprehension, Muscle incoordination  .18-.30              Confusion, Staggered gait, Slurred speech  .25-.40              Markedly decreased response to stimuli, unable to stand or                        walk, vomitting, sleep or stupor  .35-.50              Comatose, Anesthesia, Subnormal body temperature        Magnesium [[id number]]  (Normal) Collected: 04/09/22 0042    Specimen: Blood Updated: 04/09/22 0319     Magnesium 1.8 mg/dL     Urinalysis With Microscopic If Indicated (No Culture) - Urine, Clean Catch [[id number]]  (Abnormal) Collected: 04/09/22 0256    Specimen: Urine, Clean Catch Updated: 04/09/22  0313     Color, UA Yellow     Appearance, UA Clear     pH, UA 5.5     Specific Gravity, UA 1.046     Glucose, UA Negative     Ketones, UA Trace     Bilirubin, UA Negative     Blood, UA Negative     Protein, UA Negative     Leuk Esterase, UA Negative     Nitrite, UA Negative     Urobilinogen, UA 0.2 E.U./dL    Narrative:      Urine microscopic not indicated.    [place] Draw [[id number]] Collected: 04/09/22 0042    Specimen: Blood Updated: 04/09/22 0147    Narrative:      The following orders were created for panel order [place] Draw.  Procedure                               Abnormality         Status                     ---------                               -----------         ------                     Green Top (Gel)[[id number]]                                  Final result               Lavender Top[[id number]]                                     Final result               Gold Top - SST[[id number]]                                   Final result               Light Blue Top[[id number]]                                   Final result                 Please view results for these tests on the individual orders.    Lavender Top [[id number]] Collected: 04/09/22 0042    Specimen: Blood Updated: 04/09/22 0147     Extra Tube hold for add-on     Comment: Auto resulted       Gold Top - SST [[id number]] Collected: 04/09/22 0042    Specimen: Blood Updated: 04/09/22 0147     Extra Tube Hold for add-ons.     Comment: Auto resulted.       Light Blue Top [[id number]] Collected: 04/09/22 0042    Specimen: Blood Updated: 04/09/22 0147     Extra Tube hold for add-on     Comment: Auto resulted       Lipase [[id number]]  (Abnormal) Collected: 04/09/22 0042    Specimen: Blood Updated: 04/09/22 0118     Lipase 1,611 U/L     Green Top (Gel) [[id number]] Collected: 04/09/22 0042    Specimen: Blood Updated: 04/09/22 0114     Extra Tube --    Comprehensive Metabolic Panel [[id number]]  (Abnormal) Collected: 04/09/22 0042    Specimen: Blood Updated:  04/09/22 0111     Glucose 113 mg/dL      BUN 11 mg/dL      Creatinine 0.94 mg/dL      Sodium 142 mmol/L      Potassium 3.3 mmol/L      Chloride 107 mmol/L      CO2 24.0 mmol/L      Calcium 8.9 mg/dL      Total Protein 6.6 g/dL      Albumin 4.10 g/dL      ALT (SGPT) 16 U/L      AST (SGOT) 24 U/L      Alkaline Phosphatase 42 U/L      Total Bilirubin 0.5 mg/dL      Globulin 2.5 gm/dL      A/G Ratio 1.6 g/dL      BUN/Creatinine Ratio 11.7     Anion Gap 11.0 mmol/L      eGFR 92.2 mL/min/1.73      Comment: National Kidney Foundation and American Society of Nephrology (ASN) Task Force recommended calculation based on the Chronic Kidney Disease Epidemiology Collaboration (CKD-EPI) equation refit without adjustment for race.       Narrative:      GFR Normal >60  Chronic Kidney Disease <60  Kidney Failure <15      Troponin [[id number]]  (Normal) Collected: 04/09/22 0042    Specimen: Blood Updated: 04/09/22 0111     Troponin T <0.010 ng/mL     Narrative:      Troponin T Reference Range:  <= 0.03 ng/mL-   Negative for AMI  >0.03 ng/mL-     Abnormal for myocardial necrosis.  Clinicians would have to utilize clinical acumen, EKG, Troponin and serial changes to determine if it is an Acute Myocardial Infarction or myocardial injury due to an underlying chronic condition.       Results may be falsely decreased if patient taking Biotin.      CBC & Differential [[id number]]  (Abnormal) Collected: 04/09/22 0042    Specimen: Blood Updated: 04/09/22 0057    Narrative:      The following orders were created for panel order CBC & Differential.  Procedure                               Abnormality         Status                     ---------                               -----------         ------                     CBC Auto Differential[[id number]]        Abnormal            Final result                 Please view results for these tests on the individual orders.    CBC Auto Differential [[id number]]  (Abnormal) Collected: 04/09/22 0042     Specimen: Blood Updated: 04/09/22 0057     WBC 11.10 10*3/mm3      RBC 4.36 10*6/mm3      Hemoglobin 13.8 g/dL      Hematocrit 40.1 %      MCV 92.1 fL      MCH 31.7 pg      MCHC 34.4 g/dL      RDW 13.9 %      RDW-SD 45.1 fl      MPV 8.8 fL      Platelets 282 10*3/mm3      Neutrophil % 84.3 %      Lymphocyte % 11.0 %      Monocyte % 2.6 %      Eosinophil % 1.6 %      Basophil % 0.5 %      Neutrophils, Absolute 9.30 10*3/mm3      Lymphocytes, Absolute 1.20 10*3/mm3      Monocytes, Absolute 0.30 10*3/mm3      Eosinophils, Absolute 0.20 10*3/mm3      Basophils, Absolute 0.10 10*3/mm3      nRBC 0.0 /100 WBC           Imaging Results (Last 24 Hours)     Procedure Component Value Units Date/Time    US Liver [[id number]] Collected: 04/09/22 0652     Updated: 04/09/22 0656    Narrative:      EXAMINATION: RIGHT UPPER QUADRANT ULTRASOUND      DATE OF EXAM: 4/9/2022 5:20 AM    HISTORY: Right upper quadrant pain.     COMPARISON EXAM: CT examination performed earlier the same date.    FINDINGS:    Liver: The liver is normal in size and echotexture without focal mass or intrahepatic biliary ductal dilatation.      Gallbladder: The gallbladder is distended and there is some gallbladder wall thickening up to 5 mm. No radiopaque stones are identified.    Common bile duct: Measures 6 mm; this is within normal limits.    Pancreas: Findings of pancreatitis seen on comparison CT examination cannot be seen on this study.    Right kidney: Right kidney appears normal but was not measured.    Ascites: None.        Impression:        There is some gallbladder wall thickening up to 5 mm. This is likely reflective of the patient's overall fluid status. The pancreas is not seen well enough by ultrasound to evaluate for the presence of pancreatitis.    Electronically signed by:  Marcel Morton M.D.    4/9/2022 4:54 AM    CT Abdomen Pelvis With Contrast [[id number]] Collected: 04/09/22 0252     Updated: 04/09/22 0304    Narrative:       EXAMINATION: CT ABDOMEN AND PELVIS WITH IV CONTRAST   April 09, 2022    INDICATION: Abdominal pain    COMPARISON: None available    PROCEDURE:   Axial CT of the abdomen and pelvis was performed following the intravenous administration of 100 ml Isovue 370.  Sagittal and coronal reformatted images were also  provided.  CT dose lowering techniques were used, to include: automated   exposure control, adjustment for patient size, and or use of iterative reconstruction.    FINDINGS:    LOWER CHEST :  Normal.    ABDOMEN:    Liver :  Normal.    Gallbladder and bile ducts:  Normal.    Spleen:  Normal.    Pancreas:  There is mild stranding around the pancreas..    Adrenal glands:  Normal.    Kidneys and ureters:  Normal. No masses or inflammatory process.  No urolithiasis.    Aorta/IVC:  Aorta normal. No aortic aneurysm or dissection.  IVC normal.    Lymph nodes:  No significant lymphadenopathy.    Stomach: Normal    Bowel: No obstruction free air, or ascites.  No mucosal thickening.  There is a 2.8 cm posterior duodenal diverticulum.    Appendix: Normal.    Peritoneum/Mesentery: Normal.    Abdominal wall: Normal.    PELVIS:    Urinary bladder: Normal.    Reproductive organs: Normal.    Lymph Nodes: Normal.    BONES:  Unremarkable.    ADDITIONAL SIGNIFICANT FINDINGS:  None.        Impression:      1.  There is very mild peripancreatic edema suggestive of mild acute pancreatitis.  2.  There is a 2.8 cm posterior duodenal diverticulum.    Electronically signed by:  Shayan Farmer M.D.    4/9/2022 1:03 AM             ASSESSMENT AND PLAN:  Mild acute pancreatitis consider related to alcohol intake  Acute epigastric abdominal pain consider related to pancreatitis could also have gastritis or an ulcer due to daily NSAID intake  Hypokalemia RESOLVED  Abnormal CT showing mild acute pancreatitis as well as duodenal diverticulum  Hypertension  Excessive NSAID intake  Family history of colon cancer    PLAN:  Okay for clear liquid  diet  Discussed taking Tylenol for pain and avoiding all NSAIDs.  Discussed using muscle rubs and heat for his hip pain  Complete alcohol cessation  Recommend screening colonoscopy as he has never had one, patient also has a history of brother having colon cancer.  Replace electrolytes as needed  Repeat labs in the morning with lipase.  Will consider increasing diet if he is doing well.      I discussed the patient's findings and my recommendations with the patient.  MELO Dunn  04/09/22  21:11 EDT    Time:         Electronically signed by KATERINA Helms MD at 04/09/22 [id number]

## 2023-07-23 ENCOUNTER — HOSPITAL ENCOUNTER (EMERGENCY)
Facility: HOSPITAL | Age: 63
Discharge: LEFT BEFORE DISCHARGE | End: 2023-07-23
Attending: EMERGENCY MEDICINE
Payer: COMMERCIAL

## 2023-07-23 PROCEDURE — 99211 OFF/OP EST MAY X REQ PHY/QHP: CPT | Performed by: EMERGENCY MEDICINE

## 2023-07-23 NOTE — ED TRIAGE NOTES
Patient reports that he cannot wait any longer to be seen. Patient informed of risks of leaving, up to and including death. Patient left the ED with steady gait and clear speech.